# Patient Record
Sex: MALE | Race: WHITE | NOT HISPANIC OR LATINO | Employment: OTHER | URBAN - METROPOLITAN AREA
[De-identification: names, ages, dates, MRNs, and addresses within clinical notes are randomized per-mention and may not be internally consistent; named-entity substitution may affect disease eponyms.]

---

## 2019-06-05 ENCOUNTER — TRANSCRIBE ORDERS (OUTPATIENT)
Dept: NEUROSURGERY | Facility: CLINIC | Age: 64
End: 2019-06-05

## 2019-06-05 DIAGNOSIS — M54.50 LOWER BACK PAIN: Primary | ICD-10-CM

## 2019-06-10 ENCOUNTER — OFFICE VISIT (OUTPATIENT)
Dept: NEUROSURGERY | Facility: CLINIC | Age: 64
End: 2019-06-10
Payer: MEDICARE

## 2019-06-10 VITALS
TEMPERATURE: 97.8 F | HEIGHT: 71 IN | HEART RATE: 80 BPM | RESPIRATION RATE: 16 BRPM | BODY MASS INDEX: 25.34 KG/M2 | WEIGHT: 181 LBS | SYSTOLIC BLOOD PRESSURE: 132 MMHG | DIASTOLIC BLOOD PRESSURE: 76 MMHG

## 2019-06-10 DIAGNOSIS — M54.16 LUMBAR RADICULOPATHY: ICD-10-CM

## 2019-06-10 DIAGNOSIS — M48.062 SPINAL STENOSIS OF LUMBAR REGION WITH NEUROGENIC CLAUDICATION: Primary | ICD-10-CM

## 2019-06-10 DIAGNOSIS — M25.552 LEFT HIP PAIN: ICD-10-CM

## 2019-06-10 DIAGNOSIS — M54.50 LOWER BACK PAIN: ICD-10-CM

## 2019-06-10 PROCEDURE — 99204 OFFICE O/P NEW MOD 45 MIN: CPT | Performed by: PHYSICIAN ASSISTANT

## 2019-06-10 RX ORDER — ALBUTEROL SULFATE 90 UG/1
AEROSOL, METERED RESPIRATORY (INHALATION)
COMMUNITY

## 2019-06-10 RX ORDER — OXYCODONE HYDROCHLORIDE 5 MG/1
5 TABLET ORAL EVERY 6 HOURS PRN
COMMUNITY
Start: 2019-06-05 | End: 2019-06-17

## 2019-06-10 RX ORDER — PREDNISONE 20 MG/1
TABLET ORAL
COMMUNITY
Start: 2019-06-05 | End: 2019-06-17

## 2019-06-10 RX ORDER — GABAPENTIN 600 MG/1
2 TABLET ORAL 3 TIMES DAILY
COMMUNITY
End: 2019-08-22 | Stop reason: ALTCHOICE

## 2019-06-10 RX ORDER — DULOXETIN HYDROCHLORIDE 60 MG/1
CAPSULE, DELAYED RELEASE ORAL DAILY
COMMUNITY

## 2019-06-10 RX ORDER — OXYCODONE AND ACETAMINOPHEN 7.5; 325 MG/1; MG/1
TABLET ORAL
COMMUNITY
End: 2019-06-10 | Stop reason: ALTCHOICE

## 2019-06-12 ENCOUNTER — HOSPITAL ENCOUNTER (OUTPATIENT)
Dept: RADIOLOGY | Facility: HOSPITAL | Age: 64
Discharge: HOME | End: 2019-06-12
Payer: MEDICARE

## 2019-06-12 DIAGNOSIS — M48.062 SPINAL STENOSIS OF LUMBAR REGION WITH NEUROGENIC CLAUDICATION: ICD-10-CM

## 2019-06-12 DIAGNOSIS — M54.16 LUMBAR RADICULOPATHY: ICD-10-CM

## 2019-06-12 DIAGNOSIS — M54.50 LOWER BACK PAIN: ICD-10-CM

## 2019-06-12 DIAGNOSIS — M25.552 LEFT HIP PAIN: ICD-10-CM

## 2019-06-12 PROCEDURE — 73522 X-RAY EXAM HIPS BI 3-4 VIEWS: CPT

## 2019-06-12 PROCEDURE — 72114 X-RAY EXAM L-S SPINE BENDING: CPT

## 2019-06-17 ENCOUNTER — OFFICE VISIT (OUTPATIENT)
Dept: OBGYN CLINIC | Facility: HOSPITAL | Age: 64
End: 2019-06-17
Payer: MEDICARE

## 2019-06-17 VITALS
WEIGHT: 183 LBS | SYSTOLIC BLOOD PRESSURE: 135 MMHG | HEIGHT: 71 IN | DIASTOLIC BLOOD PRESSURE: 82 MMHG | BODY MASS INDEX: 25.62 KG/M2 | HEART RATE: 85 BPM

## 2019-06-17 DIAGNOSIS — M16.12 PRIMARY OSTEOARTHRITIS OF ONE HIP, LEFT: ICD-10-CM

## 2019-06-17 DIAGNOSIS — M25.552 LEFT HIP PAIN: ICD-10-CM

## 2019-06-17 DIAGNOSIS — M70.62 TROCHANTERIC BURSITIS OF LEFT HIP: Primary | ICD-10-CM

## 2019-06-17 PROCEDURE — 20610 DRAIN/INJ JOINT/BURSA W/O US: CPT | Performed by: PHYSICIAN ASSISTANT

## 2019-06-17 PROCEDURE — 99203 OFFICE O/P NEW LOW 30 MIN: CPT | Performed by: PHYSICIAN ASSISTANT

## 2019-06-17 RX ORDER — BUPIVACAINE HYDROCHLORIDE 2.5 MG/ML
2 INJECTION, SOLUTION INFILTRATION; PERINEURAL
Status: COMPLETED | OUTPATIENT
Start: 2019-06-17 | End: 2019-06-17

## 2019-06-17 RX ORDER — METHYLPREDNISOLONE ACETATE 40 MG/ML
2 INJECTION, SUSPENSION INTRA-ARTICULAR; INTRALESIONAL; INTRAMUSCULAR; SOFT TISSUE
Status: COMPLETED | OUTPATIENT
Start: 2019-06-17 | End: 2019-06-17

## 2019-06-17 RX ORDER — LIDOCAINE HYDROCHLORIDE 10 MG/ML
5 INJECTION, SOLUTION INFILTRATION; PERINEURAL
Status: COMPLETED | OUTPATIENT
Start: 2019-06-17 | End: 2019-06-17

## 2019-06-17 RX ADMIN — BUPIVACAINE HYDROCHLORIDE 2 ML: 2.5 INJECTION, SOLUTION INFILTRATION; PERINEURAL at 19:06

## 2019-06-17 RX ADMIN — METHYLPREDNISOLONE ACETATE 2 ML: 40 INJECTION, SUSPENSION INTRA-ARTICULAR; INTRALESIONAL; INTRAMUSCULAR; SOFT TISSUE at 19:06

## 2019-06-17 RX ADMIN — LIDOCAINE HYDROCHLORIDE 5 ML: 10 INJECTION, SOLUTION INFILTRATION; PERINEURAL at 19:06

## 2019-06-21 ENCOUNTER — OFFICE VISIT (OUTPATIENT)
Dept: OBGYN CLINIC | Facility: MEDICAL CENTER | Age: 64
End: 2019-06-21
Payer: MEDICARE

## 2019-06-21 VITALS
SYSTOLIC BLOOD PRESSURE: 130 MMHG | DIASTOLIC BLOOD PRESSURE: 78 MMHG | HEIGHT: 71 IN | WEIGHT: 179.4 LBS | BODY MASS INDEX: 25.11 KG/M2 | HEART RATE: 82 BPM

## 2019-06-21 DIAGNOSIS — M16.12 ARTHRITIS OF LEFT HIP: Primary | ICD-10-CM

## 2019-06-21 DIAGNOSIS — M70.62 TROCHANTERIC BURSITIS OF LEFT HIP: ICD-10-CM

## 2019-06-21 PROCEDURE — 99214 OFFICE O/P EST MOD 30 MIN: CPT | Performed by: ORTHOPAEDIC SURGERY

## 2019-06-21 RX ORDER — DICLOFENAC SODIUM 75 MG/1
75 TABLET, DELAYED RELEASE ORAL 2 TIMES DAILY PRN
Qty: 60 TABLET | Refills: 1 | Status: SHIPPED | OUTPATIENT
Start: 2019-06-21

## 2019-06-27 ENCOUNTER — HOSPITAL ENCOUNTER (OUTPATIENT)
Facility: HOSPITAL | Age: 64
Setting detail: OBSERVATION
Discharge: HOME | End: 2019-06-28
Attending: EMERGENCY MEDICINE | Admitting: INTERNAL MEDICINE
Payer: MEDICARE

## 2019-06-27 ENCOUNTER — APPOINTMENT (EMERGENCY)
Dept: RADIOLOGY | Facility: HOSPITAL | Age: 64
End: 2019-06-27
Payer: MEDICARE

## 2019-06-27 DIAGNOSIS — M79.605 PAIN OF LEFT LOWER EXTREMITY: ICD-10-CM

## 2019-06-27 DIAGNOSIS — R26.2 AMBULATORY DYSFUNCTION: Primary | ICD-10-CM

## 2019-06-27 DIAGNOSIS — M70.62 TROCHANTERIC BURSITIS OF LEFT HIP: ICD-10-CM

## 2019-06-27 DIAGNOSIS — M79.605 LEFT LEG PAIN: ICD-10-CM

## 2019-06-27 PROBLEM — M79.606 LEG PAIN: Status: ACTIVE | Noted: 2019-06-27

## 2019-06-27 PROBLEM — Z86.03 HISTORY OF RESECTION OF MENINGIOMA: Status: ACTIVE | Noted: 2019-06-27

## 2019-06-27 PROBLEM — Z72.0 TOBACCO ABUSE: Status: ACTIVE | Noted: 2019-06-27

## 2019-06-27 PROBLEM — Z98.890 HISTORY OF RESECTION OF MENINGIOMA: Status: ACTIVE | Noted: 2019-06-27

## 2019-06-27 PROBLEM — M70.60 TROCHANTERIC BURSITIS: Status: ACTIVE | Noted: 2019-06-27

## 2019-06-27 LAB
ANION GAP SERPL CALCULATED.3IONS-SCNC: 7 MMOL/L (ref 4–13)
BASOPHILS # BLD AUTO: 0.05 THOUSANDS/ΜL (ref 0–0.1)
BASOPHILS NFR BLD AUTO: 1 % (ref 0–1)
BUN SERPL-MCNC: 25 MG/DL (ref 5–25)
CALCIUM SERPL-MCNC: 10.4 MG/DL (ref 8.3–10.1)
CHLORIDE SERPL-SCNC: 107 MMOL/L (ref 100–108)
CO2 SERPL-SCNC: 25 MMOL/L (ref 21–32)
CREAT SERPL-MCNC: 1.05 MG/DL (ref 0.6–1.3)
CRP SERPL QL: 6.6 MG/L
EOSINOPHIL # BLD AUTO: 0.1 THOUSAND/ΜL (ref 0–0.61)
EOSINOPHIL NFR BLD AUTO: 1 % (ref 0–6)
ERYTHROCYTE [DISTWIDTH] IN BLOOD BY AUTOMATED COUNT: 14.3 % (ref 11.6–15.1)
ERYTHROCYTE [SEDIMENTATION RATE] IN BLOOD: 7 MM/HOUR (ref 0–10)
GFR SERPL CREATININE-BSD FRML MDRD: 75 ML/MIN/1.73SQ M
GLUCOSE SERPL-MCNC: 169 MG/DL (ref 65–140)
HCT VFR BLD AUTO: 53.3 % (ref 36.5–49.3)
HGB BLD-MCNC: 17.3 G/DL (ref 12–17)
IMM GRANULOCYTES # BLD AUTO: 0.04 THOUSAND/UL (ref 0–0.2)
IMM GRANULOCYTES NFR BLD AUTO: 0 % (ref 0–2)
LYMPHOCYTES # BLD AUTO: 3.29 THOUSANDS/ΜL (ref 0.6–4.47)
LYMPHOCYTES NFR BLD AUTO: 37 % (ref 14–44)
MCH RBC QN AUTO: 32.5 PG (ref 26.8–34.3)
MCHC RBC AUTO-ENTMCNC: 32.5 G/DL (ref 31.4–37.4)
MCV RBC AUTO: 100 FL (ref 82–98)
MONOCYTES # BLD AUTO: 0.6 THOUSAND/ΜL (ref 0.17–1.22)
MONOCYTES NFR BLD AUTO: 7 % (ref 4–12)
NEUTROPHILS # BLD AUTO: 4.92 THOUSANDS/ΜL (ref 1.85–7.62)
NEUTS SEG NFR BLD AUTO: 54 % (ref 43–75)
NRBC BLD AUTO-RTO: 0 /100 WBCS
PLATELET # BLD AUTO: 348 THOUSANDS/UL (ref 149–390)
PMV BLD AUTO: 9.7 FL (ref 8.9–12.7)
POTASSIUM SERPL-SCNC: 4.5 MMOL/L (ref 3.5–5.3)
RBC # BLD AUTO: 5.33 MILLION/UL (ref 3.88–5.62)
SODIUM SERPL-SCNC: 139 MMOL/L (ref 136–145)
WBC # BLD AUTO: 9 THOUSAND/UL (ref 4.31–10.16)

## 2019-06-27 PROCEDURE — 96376 TX/PRO/DX INJ SAME DRUG ADON: CPT

## 2019-06-27 PROCEDURE — 86140 C-REACTIVE PROTEIN: CPT | Performed by: EMERGENCY MEDICINE

## 2019-06-27 PROCEDURE — 99285 EMERGENCY DEPT VISIT HI MDM: CPT | Performed by: EMERGENCY MEDICINE

## 2019-06-27 PROCEDURE — 99220 PR INITIAL OBSERVATION CARE/DAY 70 MINUTES: CPT | Performed by: INTERNAL MEDICINE

## 2019-06-27 PROCEDURE — 99285 EMERGENCY DEPT VISIT HI MDM: CPT

## 2019-06-27 PROCEDURE — 96374 THER/PROPH/DIAG INJ IV PUSH: CPT

## 2019-06-27 PROCEDURE — NC001 PR NO CHARGE: Performed by: INTERNAL MEDICINE

## 2019-06-27 PROCEDURE — 85025 COMPLETE CBC W/AUTO DIFF WBC: CPT | Performed by: EMERGENCY MEDICINE

## 2019-06-27 PROCEDURE — 96375 TX/PRO/DX INJ NEW DRUG ADDON: CPT

## 2019-06-27 PROCEDURE — 80048 BASIC METABOLIC PNL TOTAL CA: CPT | Performed by: EMERGENCY MEDICINE

## 2019-06-27 PROCEDURE — 85652 RBC SED RATE AUTOMATED: CPT | Performed by: EMERGENCY MEDICINE

## 2019-06-27 PROCEDURE — 36415 COLL VENOUS BLD VENIPUNCTURE: CPT | Performed by: EMERGENCY MEDICINE

## 2019-06-27 PROCEDURE — 73700 CT LOWER EXTREMITY W/O DYE: CPT

## 2019-06-27 PROCEDURE — 1123F ACP DISCUSS/DSCN MKR DOCD: CPT | Performed by: INTERNAL MEDICINE

## 2019-06-27 RX ORDER — ACETAMINOPHEN 325 MG/1
975 TABLET ORAL EVERY 8 HOURS SCHEDULED
Status: DISCONTINUED | OUTPATIENT
Start: 2019-06-27 | End: 2019-06-28 | Stop reason: HOSPADM

## 2019-06-27 RX ORDER — FENTANYL CITRATE 50 UG/ML
50 INJECTION, SOLUTION INTRAMUSCULAR; INTRAVENOUS ONCE
Status: COMPLETED | OUTPATIENT
Start: 2019-06-27 | End: 2019-06-27

## 2019-06-27 RX ORDER — NICOTINE 21 MG/24HR
1 PATCH, TRANSDERMAL 24 HOURS TRANSDERMAL DAILY
Status: DISCONTINUED | OUTPATIENT
Start: 2019-06-27 | End: 2019-06-28 | Stop reason: HOSPADM

## 2019-06-27 RX ORDER — HYDROMORPHONE HCL/PF 1 MG/ML
0.5 SYRINGE (ML) INJECTION EVERY 4 HOURS PRN
Status: DISCONTINUED | OUTPATIENT
Start: 2019-06-27 | End: 2019-06-27

## 2019-06-27 RX ORDER — OXYCODONE HYDROCHLORIDE 5 MG/1
5 TABLET ORAL EVERY 4 HOURS PRN
Status: DISCONTINUED | OUTPATIENT
Start: 2019-06-27 | End: 2019-06-28

## 2019-06-27 RX ORDER — LIDOCAINE 50 MG/G
1 PATCH TOPICAL DAILY
Status: DISCONTINUED | OUTPATIENT
Start: 2019-06-28 | End: 2019-06-28 | Stop reason: HOSPADM

## 2019-06-27 RX ORDER — DULOXETIN HYDROCHLORIDE 60 MG/1
60 CAPSULE, DELAYED RELEASE ORAL DAILY
Status: DISCONTINUED | OUTPATIENT
Start: 2019-06-28 | End: 2019-06-28 | Stop reason: HOSPADM

## 2019-06-27 RX ORDER — KETOROLAC TROMETHAMINE 30 MG/ML
15 INJECTION, SOLUTION INTRAMUSCULAR; INTRAVENOUS ONCE
Status: COMPLETED | OUTPATIENT
Start: 2019-06-27 | End: 2019-06-27

## 2019-06-27 RX ORDER — METHOCARBAMOL 500 MG/1
500 TABLET, FILM COATED ORAL EVERY 6 HOURS PRN
Status: DISCONTINUED | OUTPATIENT
Start: 2019-06-27 | End: 2019-06-28 | Stop reason: HOSPADM

## 2019-06-27 RX ORDER — GABAPENTIN 300 MG/1
600 CAPSULE ORAL 3 TIMES DAILY
Status: DISCONTINUED | OUTPATIENT
Start: 2019-06-27 | End: 2019-06-28 | Stop reason: HOSPADM

## 2019-06-27 RX ORDER — HYDROMORPHONE HCL/PF 1 MG/ML
0.2 SYRINGE (ML) INJECTION EVERY 4 HOURS PRN
Status: DISCONTINUED | OUTPATIENT
Start: 2019-06-27 | End: 2019-06-28

## 2019-06-27 RX ORDER — HYDROMORPHONE HCL/PF 1 MG/ML
0.2 SYRINGE (ML) INJECTION EVERY 4 HOURS PRN
Status: DISCONTINUED | OUTPATIENT
Start: 2019-06-27 | End: 2019-06-27

## 2019-06-27 RX ORDER — KETOROLAC TROMETHAMINE 30 MG/ML
30 INJECTION, SOLUTION INTRAMUSCULAR; INTRAVENOUS EVERY 6 HOURS PRN
Status: DISCONTINUED | OUTPATIENT
Start: 2019-06-27 | End: 2019-06-28 | Stop reason: HOSPADM

## 2019-06-27 RX ADMIN — ACETAMINOPHEN 975 MG: 325 TABLET ORAL at 19:45

## 2019-06-27 RX ADMIN — FENTANYL CITRATE 50 MCG: 50 INJECTION INTRAMUSCULAR; INTRAVENOUS at 11:55

## 2019-06-27 RX ADMIN — KETOROLAC TROMETHAMINE 15 MG: 30 INJECTION, SOLUTION INTRAMUSCULAR at 14:49

## 2019-06-27 RX ADMIN — FENTANYL CITRATE 50 MCG: 50 INJECTION INTRAMUSCULAR; INTRAVENOUS at 13:28

## 2019-06-27 RX ADMIN — HYDROMORPHONE HYDROCHLORIDE 0.2 MG: 1 INJECTION, SOLUTION INTRAMUSCULAR; INTRAVENOUS; SUBCUTANEOUS at 19:45

## 2019-06-27 RX ADMIN — NICOTINE 1 PATCH: 21 PATCH, EXTENDED RELEASE TRANSDERMAL at 19:44

## 2019-06-27 RX ADMIN — OXYCODONE HYDROCHLORIDE 5 MG: 5 TABLET ORAL at 17:38

## 2019-06-27 RX ADMIN — HYDROMORPHONE HYDROCHLORIDE 0.5 MG: 1 INJECTION, SOLUTION INTRAMUSCULAR; INTRAVENOUS; SUBCUTANEOUS at 16:26

## 2019-06-27 NOTE — PLAN OF CARE
Problem: Potential for Falls  Goal: Patient will remain free of falls  Description  INTERVENTIONS:  - Assess patient frequently for physical needs  -  Identify cognitive and physical deficits and behaviors that affect risk of falls    -  [place] fall precautions as indicated by assessment   - Educate patient/family on patient safety including physical limitations  - Instruct patient to call for assistance with activity based on assessment  - Modify environment to reduce risk of injury  - Consider OT/PT consult to assist with strengthening/mobility  Outcome: Progressing     Problem: PAIN - ADULT  Goal: Verbalizes/displays adequate comfort level or baseline comfort level  Description  Interventions:  - Encourage patient to monitor pain and request assistance  - Assess pain using appropriate pain scale  - Administer analgesics based on type and severity of pain and evaluate response  - Implement non-pharmacological measures as appropriate and evaluate response  - Consider cultural and social influences on pain and pain management  - Notify physician/advanced practitioner if interventions unsuccessful or patient reports new pain  Outcome: Progressing     Problem: INFECTION - ADULT  Goal: Absence or prevention of progression during hospitalization  Description  INTERVENTIONS:  - Assess and monitor for signs and symptoms of infection  - Monitor lab/diagnostic results  - Monitor all insertion sites, i e  indwelling lines, tubes, and drains  - Monitor endotracheal (as able) and nasal secretions for changes in amount and color  - Lyle appropriate cooling/warming therapies per order  - Administer medications as ordered  - Instruct and encourage patient and family to use good hand hygiene technique  - Identify and instruct in appropriate isolation precautions for identified infection/condition  Outcome: Progressing     Problem: SAFETY ADULT  Goal: Maintain or return to baseline ADL function  Description  INTERVENTIONS:  -  Assess patient's ability to carry out ADLs; assess patient's baseline for ADL function and identify physical deficits which impact ability to perform ADLs (bathing, care of mouth/teeth, toileting, grooming, dressing, etc )  - Assess/evaluate cause of self-care deficits   - Assess range of motion  - Assess patient's mobility; develop plan if impaired  - Assess patient's need for assistive devices and provide as appropriate  - Encourage maximum independence but intervene and supervise when necessary  ¯ Involve family in performance of ADLs  ¯ Assess for home care needs following discharge   ¯ Request OT consult to assist with ADL evaluation and planning for discharge  ¯ Provide patient education as appropriate  Outcome: Progressing  Goal: Maintain or return mobility status to optimal level  Description  INTERVENTIONS:  - Assess patient's baseline mobility status (ambulation, transfers, stairs, etc )    - Identify cognitive and physical deficits and behaviors that affect mobility  - Identify mobility aids required to assist with transfers and/or ambulation (gait belt, sit-to-stand, lift, walker, cane, etc )  - [place] fall precautions as indicated by assessment  - Record patient progress and toleration of activity level on Mobility SBAR; progress patient to next Phase/Stage  - Instruct patient to call for assistance with activity based on assessment  - Request Rehabilitation consult to assist with strengthening/weightbearing, etc   Outcome: Progressing     Problem: DISCHARGE PLANNING  Goal: Discharge to home or other facility with appropriate resources  Description  INTERVENTIONS:  - Identify barriers to discharge w/patient and caregiver  - Arrange for needed discharge resources and transportation as appropriate  - Identify discharge learning needs (meds, wound care, etc )  - Arrange for interpretive services to assist at discharge as needed  - Refer to Case Management Department for coordinating discharge planning if the patient needs post-hospital services based on physician/advanced practitioner order or complex needs related to functional status, cognitive ability, or social support system  Outcome: Progressing

## 2019-06-27 NOTE — ED ATTENDING ATTESTATION
Reniier Bowser MD, saw and evaluated the patient  I have discussed the patient with the resident/non-physician practitioner and agree with the resident's/non-physician practitioner's findings, Plan of Care, and MDM as documented in the resident's/non-physician practitioner's note, except where noted  All available labs and Radiology studies were reviewed  I was present for key portions of any procedure(s) performed by the resident/non-physician practitioner and I was immediately available to provide assistance  At this point I agree with the current assessment done in the Emergency Department  I have conducted an independent evaluation of this patient a history and physical is as follows:    OA:  This is evaluation of a 70-year-old male with past medical history of left trochanteric bursitis and previous back surgery done at [facility] who presents to the emergency department with left leg pain  Patient states that he had a steroid injection for his bursitis on the 17th of June and since that time he has had progressive pain weakness and difficulty ambulating  He denies any other symptoms of back pain  No abdominal pain  No nausea no vomiting  No fevers no chills  Patient also states that when he arrived here in hospital nursing staff noted that he had a rash over his lateral hip going across his groin  This is the same distribution of which is pain radiates  No fevers no chills  Tolerating p o  Without difficulty  No chest pain no shortness of breath no lightheadedness or dizziness  On physical exam patient is uncomfortable but nontoxic  He is mildly tachycardic  HEENT is normocephalic and atraumatic with moist mucous membranes and clear sclera and conjunctiva  Neck supple full range of motion  There is no midline tenderness to palpation over CT or L-spine  There is a well-healed scar over his lower L-spine    Patient has discrete tenderness to palpation over his left lateral hip pain with range of motion  There is no associated edema or erythema however the patient does have a blanching macular rash that extends over his lateral hip across his groin and lower abdomen  It is nonpruritic  It is nonpainful  There are no associated this colles or bull eye  He has intact distal pulses  Capillary refills less than 2 seconds  There is no lower extremity edema  Distal reflexes of patella and ankle are intact  He has intact strength  Assessment and plan left hip pain with rash  Given history will obtain CT imaging  Will check basic blood work  Will treat pain  Monitor re-evaluate dispo accordingly  Portions of the record may have been created with voice recognition software  Occasional wrong word or sound-a-like" substitutions may have occurred due to the inherent limitations of voice recognition software  Review chart carefully and recognize, using context, where substitutions have occurred    Critical Care Time  Procedures

## 2019-06-27 NOTE — ED NOTES
Pt reports decrease in LLE pain following Fentanyl admin  Pt also instructed to provide urine sample and was provided with urinal at bedside        Audelia Craft RN  06/27/19 7038

## 2019-06-27 NOTE — ED RE-EVALUATION NOTE
Pt with resolution of rash  Pain is persistent  Discussed further imaging, including that of his spine  On exam, he has weakness but intact reflexes and no pain in BM/bladder   Agreeable to admission for further evaluation, pain control and ambulatory dysfunction     Grey Ruiz MD  06/30/19 6216

## 2019-06-27 NOTE — ED NOTES
Pt reports he is unable to provide urine sample at this time     Amira Christensen, ANDREA  06/27/19 8427

## 2019-06-27 NOTE — PROGRESS NOTES
INTERNAL MEDICINE RESIDENCY  SENIOR ADMISSION NOTE     Unit/Bed#: ED 15   Encounter: [id number]  SOD Team A    PATIENT INFORMATION     Anais Hay   58 y o  male   MRN: [id number]  Hospital Stay Days: 0    HISTORY OF PRESENT ILLNESS     76-year-old male with past medical history of nicotine dependence, chronic left hip pain and low back pain with multiple previous procedural interventions involving the lumbar spine beginning approximately 30 years ago  More recently, patient was complaining of acute on chronic left hip pain radiating to groin and buttocks  The patient has been evaluated by several specialists in the outpatient setting including neurosurgery and orthopedics  It was felt that the patient's primary concern of left sided hip/buttock pain is due to osteoarthritis of the left hip  There is also likely an element of lumbar neurogenic claudication secondary to adjacent levels of stenosis as well as previous fusions  The patient underwent left trochanteric bursa steroid injection on 06/17/2019 after seeing orthopedics that provided only transient relief  The patient had follow-up with orthopedics the following week due to ongoing pain  Etiology was likely osteoarthritis of the left hip as well as trochanteric bursitis of the left hip  The patient was ordered an MRI arthrogram to rule out a labral tear and provided with p r n  analgesics including diclofenac and acetaminophen  Per office records, patient declined referral to see pain management at that time  The patient presents to [facility] ED today due to ongoing complaints of severe left-sided hip pain following the steroid injection that he received on 06/17/2019  Pain was reportedly worse ever since receiving the injection and described as a burning sensation  Of note, there was description of a blanching macular rash that was present on the patient's left hip that has apparently resolved at this time    Patient received several different medications for pain control without significant relief  Because of his difficulty ambulating and uncontrolled pain, patient was recommended for admission  Reviewed H&P per Dr Ros Levine  Agree with the assessment and plan except as noted below  ASSESSMENT/PLAN     Principal Problem:    Leg pain  Active Problems:    History of resection of meningioma    Trochanteric bursitis    Tobacco abuse     1  Left hip pain, low back pain - likely secondary to several components including primarily osteoarthritis of the left hip with trochanteric bursitis, now status post steroid injection with interval exacerbation of pain  Also likely component of lumbar neurogenic claudication in setting of multiple previous lumbar surgical interventions  Lower suspicion for acute infectious bursitis given lack of infectious signs including fever or leukocytosis, normal sed rate, and only marginally elevated CRP, however this remains a concern given recent injection  Differential also includes possible labral tear as suggested at recent orthopedics visit  CT of the left hip without contrast shows no acute osseous abnormality  · Pain control:  · Robaxin 500 mg every 8 hours as needed  · Lidoderm patch 5%  · Voltaren gel apply to left hip as needed  · Acetaminophen 975 mg every 8 hours scheduled  · IV Toradol every 6 hours as needed for moderate pain  · Oxycodone every 6 hours as needed for severe pain  · IV Dilaudid every 6 hours as needed for breakthrough pain  · PT/OT evaluation  · Per orthopedics, left hip bursa aspirations are performed by IR; will place order for FL guided needle aspiration of left hip bursa  · MRI arthrogram during this visit to rule out labral tear as recommended at recent orthopedic office visit  · Continue supportive care and clinical monitoring    Remainder of management for chronic conditions as detailed in H&P per Dr Ros Levine      Code Status: Level 1 Full Code  Diet: Regular House  VTE Prophylaxis: Lovenox, SCDs    Disposition: OBSERVATION  Expected LOS: <2 Midnights    ==  Merlinda Lao, DO  Chief Resident, PGY-3  [facility] Medicine Res[facility]

## 2019-06-27 NOTE — ED NOTES
Multiple attempts to call [facility] for 10 min heads up made with no answer     Jon Ruano, RN  06/27/19 0677

## 2019-06-27 NOTE — ED PROVIDER NOTES
History  Chief Complaint   Patient presents with    Leg Pain     Pt c/o left leg pain  Pt has seen multiple doctors for this  [facility] neurosurgeon told his is was his back and needed surgery but pt snokes so she would not perform surgery  He then saw a [facility] who said maybe it is not his back and maybe it is his hip  He then saw an ortho specialist who ordered MRI but when he called to schedule the MRI they couldnt do it until July and he states eh cant wait in pain that long     57 y/o presents with Rt hip pain x 10 days following a steroid injection on 19 for chronic bursitis  Pt states that since the injection he has had pain and this am he had 10/10 pain localized to the Lt LE from the Hip top the Knee  Pain is a burning sensation  When he looked at his leg he noticed a red rash on his Lt leg  Pt can not toelrate the pain and presented to the ED for eval and MRI    Denies f,c,n,v,d,constipation, melena, hematochezia, dysuria, hematuria, genital d/c, flank pain, chest pain, abd pain, back pain, hematemesis, Pt states he had a zoster vaccine and has no hx of zoster                    Prior to Admission Medications   Prescriptions Last Dose Informant Patient Reported? Taking? DULoxetine (CYMBALTA) 60 mg delayed release capsule 2019 at Unknown time  Yes Yes   Sig: Daily   albuterol (VENTOLIN HFA) 90 mcg/act inhaler Past Week at Unknown time  Yes Yes   Sig: ventolin hfa 108 (90 base) mcg/actaers   diclofenac (VOLTAREN) 75 mg EC tablet Past Week at Unknown time  No Yes   Sig: Take 1 tablet (75 mg total) by mouth 2 (two) times a day as needed (pain) Take with food     gabapentin (NEURONTIN) 600 MG tablet 2019 at Unknown time  Yes Yes   Si capsules 3 (three) times a day      Facility-Administered Medications: None       Past Medical History:   Diagnosis Date    COPD (chronic obstructive pulmonary disease) ([facility] [address] )        Past Surgical History:   Procedure Laterality Date    SPINE SURGERY History reviewed  No pertinent family history  I have reviewed and agree with the history as documented  Social History     Tobacco Use    Smoking status: Current Every Day Smoker     Packs/day: 0 50     Years: 30 00     Pack years: 15 00    Smokeless tobacco: Never Used   Substance Use Topics    Alcohol use: Not Currently    Drug use: Never        Review of Systems   Constitutional: Negative for chills, diaphoresis, fatigue and fever  HENT: Negative for congestion, ear discharge, facial swelling, hearing loss, rhinorrhea, sinus pressure, sinus pain, sneezing, sore throat, tinnitus and trouble swallowing  Eyes: Negative for pain, discharge and redness  Respiratory: Negative for cough, choking, chest tightness, shortness of breath, wheezing and stridor  Cardiovascular: Negative for chest pain, palpitations and leg swelling  Gastrointestinal: Negative for abdominal distention, abdominal pain, blood in stool, constipation, diarrhea, nausea and vomiting  Endocrine: Negative for cold intolerance, polydipsia and polyuria  Genitourinary: Negative for difficulty urinating, dysuria, enuresis, flank pain, frequency and hematuria  Musculoskeletal: Positive for arthralgias  Negative for back pain, gait problem and neck stiffness  Skin: Negative for rash and wound  Neurological: Negative for dizziness, seizures, syncope, weakness, numbness and headaches  Hematological: Negative for adenopathy  Psychiatric/Behavioral: Negative for agitation, confusion, hallucinations, sleep disturbance and suicidal ideas  All other systems reviewed and are negative        Physical Exam  ED Triage Vitals   Temperature Pulse Respirations Blood Pressure SpO2   06/27/19 1133 06/27/19 1055 06/27/19 1055 06/27/19 1055 06/27/19 1055   98 5 °F (36 9 °C) (!) 118 20 120/79 97 %      Temp Source Heart Rate Source Patient Position - Orthostatic VS BP Location FiO2 (%)   06/27/19 1133 06/27/19 1055 06/27/19 1055 06/27/19 1055 --   Oral Monitor Lying Right arm       Pain Score       06/27/19 1055       Worst Possible Pain             Orthostatic Vital Signs  Vitals:    06/27/19 1326 06/27/19 1400 06/27/19 1455 06/27/19 1500   BP: 122/78 130/86 (!) 148/104 152/85   Pulse: (!) 113 92 96    Patient Position - Orthostatic VS: Lying Lying Lying        Physical Exam   Constitutional: He is oriented to person, place, and time  He appears well-developed and well-nourished  No distress  HENT:   Head: Normocephalic and atraumatic  Eyes: Pupils are equal, round, and reactive to light  Conjunctivae and EOM are normal    Neck: Normal range of motion  Cardiovascular: Normal rate and regular rhythm  Exam reveals no gallop and no friction rub  No murmur heard  Pulmonary/Chest: Breath sounds normal  No respiratory distress  He has no wheezes  He has no rales  Abdominal: Soft  Normal appearance and bowel sounds are normal  There is no tenderness  There is no rigidity, no rebound, no guarding, no CVA tenderness, no tenderness at McBurney's point and negative Fairchild's sign  Neurological: He is alert and oriented to person, place, and time  No cranial nerve deficit or sensory deficit  GCS eye subscore is 4  GCS verbal subscore is 5  GCS motor subscore is 6  Reflex Scores:       Bicep reflexes are 2+ on the right side and 2+ on the left side  Patellar reflexes are 2+ on the right side and 2+ on the left side  Patient has equal 5/5 strength b/l UE and Le  No focal neuro deficits noted  Skin: Skin is warm and dry  Capillary refill takes less than 2 seconds  Psychiatric: He has a normal mood and affect  His behavior is normal  Judgment and thought content normal    Nursing note and vitals reviewed        ED Medications  Medications   fentanyl citrate (PF) 100 MCG/2ML 50 mcg (50 mcg Intravenous Given 6/27/19 1155)   fentanyl citrate (PF) 100 MCG/2ML 50 mcg (50 mcg Intravenous Given 6/27/19 1328)   ketorolac (TORADOL) injection 15 mg (15 mg Intravenous Given 6/27/19 1449)       Diagnostic Studies  Results Reviewed     Procedure Component Value Units Date/Time    Sedimentation rate, automated [[id number]]  (Normal) Collected:  06/27/19 1158    Lab Status:  Final result Specimen:  Blood from Arm, Right Updated:  06/27/19 1358     Sed Rate 7 mm/hour     Basic metabolic panel [[id number]]  (Abnormal) Collected:  06/27/19 1158    Lab Status:  Final result Specimen:  Blood from Arm, Right Updated:  06/27/19 1222     Sodium 139 mmol/L      Potassium 4 5 mmol/L      Chloride 107 mmol/L      CO2 25 mmol/L      ANION GAP 7 mmol/L      BUN 25 mg/dL      Creatinine 1 05 mg/dL      Glucose 169 mg/dL      Calcium 10 4 mg/dL      eGFR 75 ml/min/1 73sq m     Narrative:       [place] guidelines for Chronic Kidney Disease (CKD):     Stage 1 with normal or high GFR (GFR > 90 mL/min/1 73 square meters)    Stage 2 Mild CKD (GFR = 60-89 mL/min/1 73 square meters)    Stage 3A Moderate CKD (GFR = 45-59 mL/min/1 73 square meters)    Stage 3B Moderate CKD (GFR = 30-44 mL/min/1 73 square meters)    Stage 4 Severe CKD (GFR = 15-29 mL/min/1 73 square meters)    Stage 5 End Stage CKD (GFR <15 mL/min/1 73 square meters)  Note: GFR calculation is accurate only with a steady state creatinine    C-reactive protein [[id number]]  (Abnormal) Collected:  06/27/19 1158    Lab Status:  Final result Specimen:  Blood from Arm, Right Updated:  06/27/19 1222     CRP 6 6 mg/L     CBC and differential [[id number]]  (Abnormal) Collected:  06/27/19 1158    Lab Status:  Final result Specimen:  Blood from Arm, Right Updated:  06/27/19 1212     WBC 9 00 Thousand/uL      RBC 5 33 Million/uL      Hemoglobin 17 3 g/dL      Hematocrit 53 3 %       fL      MCH 32 5 pg      MCHC 32 5 g/dL      RDW 14 3 %      MPV 9 7 fL      Platelets 326 Thousands/uL      nRBC 0 /100 WBCs      Neutrophils Relative 54 %      Immat GRANS % 0 %      Lymphocytes Relative 37 %      Monocytes Relative 7 %      Eosinophils Relative 1 %      Basophils Relative 1 %      Neutrophils Absolute 4 92 Thousands/µL      Immature Grans Absolute 0 04 Thousand/uL      Lymphocytes Absolute 3 29 Thousands/µL      Monocytes Absolute 0 60 Thousand/µL      Eosinophils Absolute 0 10 Thousand/µL      Basophils Absolute 0 05 Thousands/µL     POCT urinalysis dipstick [[id number]]     Lab Status:  No result Specimen:  Urine                  CT hip left without contrast   ED Interpretation by Jennifer Edmonds DO (06/27 1451)      No acute osseous abnormality  Workstation performed: [id number]         Final Result by Eula Dias MD (06/27 1346)      No acute osseous abnormality  Workstation performed: [id number]               Procedures  Procedures        ED Course                               MDM  Number of Diagnoses or Management Options  Ambulatory dysfunction: new and requires workup  Left leg pain: new and requires workup  Diagnosis management comments:  Likely vasculitis, however, will CT hip to rule out any kind of abscesses  Due to the recent injection  Patient's CRP is elevated  Patient's rash subsided approximately 1 hour after presentation to the emergency department  Patient's pain has been unable to be managed with IV narcotics, patient was given 25 and then 50 mcg of fentanyl  Patient was then given 0 5 mg of Dilaudid and 15 mg of Toradol  Pain was improved mildly, however, 8/10 at best     patient is unable to ambulate due to pain    1  Ambulatory dysfunction  - admit to Medicine, SOB    2     Poor pain control  -admit to Medicine for failed pain management      Disposition  Final diagnoses:   Left leg pain   Ambulatory dysfunction     Time reflects when diagnosis was documented in both MDM as applicable and the Disposition within this note     Time User Action Codes Description Comment    6/27/2019  3:38 PM Renato Collazo Add [U30 726] Left leg pain 6/27/2019  3:38 PM Yariel Agudelo Add [R26 2] Ambulatory dysfunction     6/27/2019  3:38 PM Yariel Agudelo Modify [M79 605] Left leg pain     6/27/2019  3:38 PM Katia Collazo Modify [R26 2] Ambulatory dysfunction       ED Disposition     ED Disposition Condition Date/Time Comment    Admit Stable Thu Jun 27, 2019  3:38 PM Case was discussed with SOD and the patient's admission status was agreed to be Admission Status: observation status to the service of Dr Slim Fleming   Follow-up Information    None         Patient's Medications   Discharge Prescriptions    No medications on file     No discharge procedures on file  ED Provider  Attending physically available and evaluated Guy Sidra VAUGHN managed the patient along with the ED Attending      Electronically Signed by         Azam Gale DO  06/27/19 3250

## 2019-06-27 NOTE — ED NOTES
Upon assessment rash on pts hips-groin appears to have dissapated  Dr Neo Loera made aware        Benjamen Mary, RN  06/27/19 7221

## 2019-06-27 NOTE — H&P
INTERNAL MEDICINE HISTORY AND PHYSICAL  ED 15 SOD Team A    NAME: Stevne Goldstein  AGE: 58 y o  SEX: male  : 1955   MRN: [id number]  ENCOUNTER: [id number]    DATE: 2019  TIME: 4:13 PM    Primary Care Physician: Sandra Shepard MD  Admitting Provider: Raquel Arrington MD    Chief complaint: Leg Pain    History of Present Illness     Steven Goldstein is a 58 y o  male with a past medical history significant for nicotine dependence, chronic left hip pain, low back pain with multiple previous interventions, meningioma status post resection who presents with complaints of acute left hip pain  The patient states that the pain radiates down to his left groin and buttocks  The patient underwent a left trochanteric bursa steroid injection on 2019  Shortly thereafter, the patient states that he began to experience severe left-sided hip pain at the site of injection  He states that he feels extreme tenderness to palpation of the left hip region  The patient has had numerous steroid injections which usually provide relief  In the ED, the patient was found to be hemodynamically stable  Laboratory analysis was unremarkable and imaging of the left hip with CT scan was negative for acute osseous pathology  The pain was not relieved by fentanyl in the ED  Of note, the patient was scheduled to have an MRI performed sometime in July per Orthopedic surgery  The patient states that he smokes 10 cigarettes per day  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   He denies alcohol abuse or illicit drug use  He endorses no significant family history  Review of Systems   Review of Systems   Constitutional: Negative  HENT: Negative  Eyes: Negative  Respiratory: Negative  Cardiovascular: Negative  Gastrointestinal: Negative  Endocrine: Negative  Genitourinary: Negative  Musculoskeletal:        Hip pain   Skin: Negative  Allergic/Immunologic: Negative      Neurological: Negative  Hematological: Negative  Psychiatric/Behavioral: Negative  Past Medical History     Past Medical History:   Diagnosis Date    COPD (chronic obstructive pulmonary disease) ([facility] [address] )        Past Surgical History     Past Surgical History:   Procedure Laterality Date    SPINE SURGERY         Social History     Social History     Substance and Sexual Activity   Alcohol Use Not Currently     Social History     Substance and Sexual Activity   Drug Use Never     Social History     Tobacco Use   Smoking Status Current Every Day Smoker    Packs/day: 0 50    Years: 30 00    Pack years: 15 00   Smokeless Tobacco Never Used       Family History   History reviewed  No pertinent family history  Medications Prior to Admission     Prior to Admission medications    Medication Sig Start Date End Date Taking? Authorizing Provider   albuterol (VENTOLIN HFA) 90 mcg/act inhaler ventolin hfa 108 (90 base) mcg/actaers   Yes Historical Provider, MD   diclofenac (VOLTAREN) 75 mg EC tablet Take 1 tablet (75 mg total) by mouth 2 (two) times a day as needed (pain) Take with food  6/21/19  Yes Dania Faye Bankelechihof, DO   DULoxetine (CYMBALTA) 60 mg delayed release capsule Daily   Yes Historical Provider, MD   gabapentin (NEURONTIN) 600 MG tablet 2 capsules 3 (three) times a day   Yes Historical Provider, MD       Allergies     Allergies   Allergen Reactions    Cefadroxil Other (See Comments) and Rash    Codeine Other (See Comments)     "sick to stomach"      Iodinated Diagnostic Agents Anaphylaxis    Sulfa Antibiotics Anaphylaxis and GI Intolerance    Clindamycin Rash       Objective     Vitals:    06/27/19 1326 06/27/19 1400 06/27/19 1455 06/27/19 1500   BP: 122/78 130/86 (!) 148/104 152/85   BP Location: Right arm Right arm Right arm    Pulse: (!) 113 92 96    Resp: 18 16 18    Temp:       TempSrc:       SpO2: 95% 95% 95%    Weight:         Body mass index is 25 03 kg/m²    No intake or output data in the 24 hours ending 06/27/19 1613  Invasive Devices     Peripheral Intravenous Line            Peripheral IV 06/27/19 Right Antecubital less than 1 day                Physical Exam   Constitutional: He is oriented to person, place, and time  He appears well-developed and well-nourished  No distress  HENT:   Head: Normocephalic and atraumatic  Nose: Nose normal    Mouth/Throat: No oropharyngeal exudate  Eyes: Conjunctivae are normal  Right eye exhibits no discharge  Left eye exhibits no discharge  No scleral icterus  Neck: Neck supple  No JVD present  Cardiovascular: Normal rate, regular rhythm, normal heart sounds and intact distal pulses  Exam reveals no gallop and no friction rub  No murmur heard  Pulmonary/Chest: Effort normal and breath sounds normal  No respiratory distress  He has no wheezes  He has no rales  Abdominal: Soft  Bowel sounds are normal  He exhibits no distension  There is no tenderness  There is no rebound and no guarding  Musculoskeletal: Normal range of motion  He exhibits tenderness  He exhibits no edema  Decreased ROM in left lower extremity   Neurological: He is alert and oriented to person, place, and time  Skin: Skin is warm and dry  He is not diaphoretic  No erythema  Psychiatric: He has a normal mood and affect  Lab Results: I have personally reviewed pertinent reports      CBC:   Results from last 7 days   Lab Units 06/27/19  1158   WBC Thousand/uL 9 00   RBC Million/uL 5 33   HEMOGLOBIN g/dL 17 3*   HEMATOCRIT % 53 3*   MCV fL 100*   MCH pg 32 5   MCHC g/dL 32 5   RDW % 14 3   MPV fL 9 7   PLATELETS Thousands/uL 348   NRBC AUTO /100 WBCs 0   NEUTROS PCT % 54   LYMPHS PCT % 37   MONOS PCT % 7   EOS PCT % 1   BASOS PCT % 1   NEUTROS ABS Thousands/µL 4 92   LYMPHS ABS Thousands/µL 3 29   MONOS ABS Thousand/µL 0 60   EOS ABS Thousand/µL 0 10   , Chemistry Profile:   Results from last 7 days   Lab Units 06/27/19  1158   POTASSIUM mmol/L 4 5   CHLORIDE mmol/L 107   CO2 mmol/L 25   BUN mg/dL 25   CREATININE mg/dL 1 05   CALCIUM mg/dL 10 4*   EGFR ml/min/1 73sq m 75   , Coagulation Studies:   , Cardiac Studies:   , Additional Labs:   , iSTAT CHEM 8:   Results from last 7 days   Lab Units 06/27/19  1158   ANION GAP mmol/L 7   EGFR ml/min/1 73sq m 75   HEMOGLOBIN g/dL 17 3*   , ABG:   , Toxicology:   , Last A1C/Lipid Panel/Thyroid Panel: No results found for: HGBA1C, TRIG, CHOL, HDL, LDLCALC, [id number], T3FREE, V7OBKBH, FREET4    Imaging: I have personally reviewed pertinent films in PACS  Xr Spine Lumbar Complete W Bending Minimum 6 Views    Result Date: 6/14/2019  Narrative: LUMBAR SPINE INDICATION:   M54 5: Low back pain M48 062: Spinal stenosis, lumbar region with neurogenic claudication M54 16: Radiculopathy, lumbar region  Left-sided low back pain, hip pain, groin pain after lifting heavy furniture since August 2018  COMPARISON:  Abdomen and pelvic CTs performed on 1/16/2019 and 1/13/2015, both at [facility]  VIEWS:  XR SPINE LUMBAR COMPLETE W BENDING MINIMUM 6 VIEWS FINDINGS: There is no evidence of acute fracture or destructive osseous lesion  Mild retrolisthesis of L2 on L3  No evidence of instability during flexion/extension  There are interbody graft cages at L4-L5 and L5-S1  There is severe degenerative disc space narrowing at L2-L3  There is a sclerotic lesion in the left iliac wing, which has been stable since 1/13/2015, therefore benign  Most likely, this is surgically placed bone cement  The pedicles appear intact  Soft tissues are unremarkable  Impression: No acute osseous abnormality  Degenerative changes as described  Workstation performed: [id number]     Ct Hip Left Without Contrast    Result Date: 6/27/2019  Narrative: CT left hip with IV contrast INDICATION: Hip pain, xray arthritis, infection not suspected  COMPARISON: 1/16/2019  TECHNIQUE: CT examination of the was performed    This examination, like all CT scans performed in the [facility], was performed utilizing techniques to minimize radiation dose exposure, including the use of iterative reconstruction and automated exposure control software  Sagittal and coronal two dimensional reconstructed images were also submitted for interpretation  IV Contrast: Rad dose  388 mGy-cm FINDINGS: OSSEOUS STRUCTURES:  No fracture, dislocation or destructive osseous lesion  Postoperative changes involving the left iliac crest noted  L4-5 L5-S1 fusion noted  VISUALIZED MUSCULATURE:  Unremarkable  SOFT TISSUES:  Unremarkable  OTHER PERTINENT FINDINGS:  None  Impression: No acute osseous abnormality  Workstation performed: [id number]     Xr Hips Bilateral 3-4 Vw W Pelvis If Performed    Result Date: 6/14/2019  Narrative: BILATERAL HIPS AND PELVIS INDICATION:   M25 552: Pain in left hip  Left-sided low back pain, hip pain, groin pain after lifting heavy furniture since August 2018  COMPARISON:  None VIEWS:  XR HIPS BILATERAL 3-4 VW W PELVIS IF PERFORMED  FINDINGS: The bony pelvis appears intact  Please see separate lumbar spine report for evaluation of the lumbar spine  There is a sclerotic lesion in the left iliac wing  There is a sclerotic lesion in the left iliac wing, which has been stable since 1/13/2015, therefore benign  Most likely, this is surgically placed bone cement  LEFT HIP: Mild left hip osteoarthritis  This is evidenced by marginal osteophytes  Soft tissues are unremarkable  RIGHT HIP: Mild right hip osteoarthritis  This is evidenced by marginal osteophytes  Soft tissues are unremarkable  Impression: No acute osseous abnormality  Mild osteoarthritis in both hips  Workstation performed: [id number]     Urinalysis:       Invalid input(s): URIBILINOGEN     Urine Micro:        EKG, Pathology, and Other Studies: I have personally reviewed pertinent reports        Medications given in Emergency Department     Medication Administration - last 24 hours from 06/26/2019 1613 to 06/27/2019 1613       Date/Time Order Dose Route Action Action by     06/27/2019 1155 fentanyl citrate (PF) 100 MCG/2ML 50 mcg 50 mcg Intravenous Given Loren Doherty RN     06/27/2019 1328 fentanyl citrate (PF) 100 MCG/2ML 50 mcg 50 mcg Intravenous Given Loren Doherty RN     06/27/2019 1449 ketorolac (TORADOL) injection 15 mg 15 mg Intravenous Given Loren Doherty RN          Assessment and Plan     1  Left Hip Pain  · Likely secondary to osteoarthritis the left hip with trochanteric bursitis  Status post steroid injection  Patient with acute complaints of left hip pain following injection  States that the pain radiates down his left anterior thigh to his patella  CT of the left hip without contrast shows no acute osseous abnormality  Patient is hemodynamically stable  Laboratory analysis unremarkable  · Dilaudid 0 5 mg IV every 4 hours as needed for severe pain/breakthrough pain  · Toradol 30 mg IV every 6 hours as needed for moderate pain  · Continue home gabapentin 600 mg PO TID  · PT/OT evaluation and treat  · Consider orthopedic surgery consultation for possible aspiration of the bursa with analysis of fluid  · To also consider MRI arthrogram during this hospital visit  · Continue to monitor clinically    2  Tobacco Abuse Disorder  · Patient states that he currently smokes 10 cigarettes per day  He is not interested in quitting at this time  · Nicotine patch 21 mg/24 hr  · Counseled on smoking cessation    Code Status: Level 1 - Full Code  VTE Pharmacologic Prophylaxis: Enoxaparin (Lovenox)   VTE Mechanical Prophylaxis: reason for no mechanical VTE prophylaxis Up and OOB as tolerated  Admission Status: OBSERVATION    Admission Time  I spent 30 minutes admitting the patient  This involved direct patient contact where I performed a full history and physical, reviewing previous records, and reviewing laboratory and other diagnostic studies      Froy Barrera DO  Internal Medicine  PGY-1

## 2019-06-28 ENCOUNTER — APPOINTMENT (OUTPATIENT)
Dept: RADIOLOGY | Facility: HOSPITAL | Age: 64
End: 2019-06-28
Payer: MEDICARE

## 2019-06-28 VITALS
BODY MASS INDEX: 25.62 KG/M2 | DIASTOLIC BLOOD PRESSURE: 61 MMHG | TEMPERATURE: 97.9 F | OXYGEN SATURATION: 97 % | HEART RATE: 77 BPM | SYSTOLIC BLOOD PRESSURE: 97 MMHG | HEIGHT: 71 IN | RESPIRATION RATE: 18 BRPM | WEIGHT: 183 LBS

## 2019-06-28 LAB
ANION GAP SERPL CALCULATED.3IONS-SCNC: 6 MMOL/L (ref 4–13)
BASOPHILS # BLD AUTO: 0.06 THOUSANDS/ΜL (ref 0–0.1)
BASOPHILS NFR BLD AUTO: 1 % (ref 0–1)
BUN SERPL-MCNC: 33 MG/DL (ref 5–25)
CALCIUM SERPL-MCNC: 9.1 MG/DL (ref 8.3–10.1)
CHLORIDE SERPL-SCNC: 108 MMOL/L (ref 100–108)
CO2 SERPL-SCNC: 28 MMOL/L (ref 21–32)
CREAT SERPL-MCNC: 0.89 MG/DL (ref 0.6–1.3)
EOSINOPHIL # BLD AUTO: 0.12 THOUSAND/ΜL (ref 0–0.61)
EOSINOPHIL NFR BLD AUTO: 2 % (ref 0–6)
ERYTHROCYTE [DISTWIDTH] IN BLOOD BY AUTOMATED COUNT: 14.3 % (ref 11.6–15.1)
GFR SERPL CREATININE-BSD FRML MDRD: 90 ML/MIN/1.73SQ M
GLUCOSE SERPL-MCNC: 103 MG/DL (ref 65–140)
HCT VFR BLD AUTO: 50.3 % (ref 36.5–49.3)
HGB BLD-MCNC: 16 G/DL (ref 12–17)
IMM GRANULOCYTES # BLD AUTO: 0.02 THOUSAND/UL (ref 0–0.2)
IMM GRANULOCYTES NFR BLD AUTO: 0 % (ref 0–2)
LYMPHOCYTES # BLD AUTO: 3.47 THOUSANDS/ΜL (ref 0.6–4.47)
LYMPHOCYTES NFR BLD AUTO: 45 % (ref 14–44)
MCH RBC QN AUTO: 31.7 PG (ref 26.8–34.3)
MCHC RBC AUTO-ENTMCNC: 31.8 G/DL (ref 31.4–37.4)
MCV RBC AUTO: 100 FL (ref 82–98)
MONOCYTES # BLD AUTO: 0.6 THOUSAND/ΜL (ref 0.17–1.22)
MONOCYTES NFR BLD AUTO: 8 % (ref 4–12)
NEUTROPHILS # BLD AUTO: 3.49 THOUSANDS/ΜL (ref 1.85–7.62)
NEUTS SEG NFR BLD AUTO: 44 % (ref 43–75)
NRBC BLD AUTO-RTO: 0 /100 WBCS
PLATELET # BLD AUTO: 275 THOUSANDS/UL (ref 149–390)
PMV BLD AUTO: 9.3 FL (ref 8.9–12.7)
POTASSIUM SERPL-SCNC: 4.3 MMOL/L (ref 3.5–5.3)
RBC # BLD AUTO: 5.04 MILLION/UL (ref 3.88–5.62)
SODIUM SERPL-SCNC: 142 MMOL/L (ref 136–145)
WBC # BLD AUTO: 7.76 THOUSAND/UL (ref 4.31–10.16)

## 2019-06-28 PROCEDURE — G8988 SELF CARE GOAL STATUS: HCPCS

## 2019-06-28 PROCEDURE — G8979 MOBILITY GOAL STATUS: HCPCS

## 2019-06-28 PROCEDURE — 80048 BASIC METABOLIC PNL TOTAL CA: CPT | Performed by: INTERNAL MEDICINE

## 2019-06-28 PROCEDURE — G8978 MOBILITY CURRENT STATUS: HCPCS

## 2019-06-28 PROCEDURE — 97166 OT EVAL MOD COMPLEX 45 MIN: CPT

## 2019-06-28 PROCEDURE — 85025 COMPLETE CBC W/AUTO DIFF WBC: CPT | Performed by: INTERNAL MEDICINE

## 2019-06-28 PROCEDURE — 97163 PT EVAL HIGH COMPLEX 45 MIN: CPT

## 2019-06-28 PROCEDURE — G8987 SELF CARE CURRENT STATUS: HCPCS

## 2019-06-28 PROCEDURE — 99217 PR OBSERVATION CARE DISCHARGE MANAGEMENT: CPT | Performed by: INTERNAL MEDICINE

## 2019-06-28 PROCEDURE — NC001 PR NO CHARGE: Performed by: INTERNAL MEDICINE

## 2019-06-28 PROCEDURE — G8989 SELF CARE D/C STATUS: HCPCS

## 2019-06-28 RX ORDER — KETOROLAC TROMETHAMINE 10 MG/1
10 TABLET, FILM COATED ORAL EVERY 6 HOURS PRN
Qty: 30 TABLET | Refills: 2 | Status: SHIPPED | OUTPATIENT
Start: 2019-06-28 | End: 2019-08-22 | Stop reason: ALTCHOICE

## 2019-06-28 RX ORDER — OXYCODONE HYDROCHLORIDE 5 MG/1
5 TABLET ORAL EVERY 6 HOURS PRN
Status: DISCONTINUED | OUTPATIENT
Start: 2019-06-28 | End: 2019-06-28

## 2019-06-28 RX ORDER — LIDOCAINE 50 MG/G
1 PATCH TOPICAL DAILY
Qty: 30 PATCH | Refills: 0 | Status: SHIPPED | OUTPATIENT
Start: 2019-06-28 | End: 2019-08-22 | Stop reason: HOSPADM

## 2019-06-28 RX ORDER — OXYCODONE HYDROCHLORIDE 5 MG/1
5 TABLET ORAL DAILY PRN
Qty: 7 TABLET | Refills: 0 | Status: SHIPPED | OUTPATIENT
Start: 2019-06-28 | End: 2019-07-05

## 2019-06-28 RX ORDER — HYDROMORPHONE HCL/PF 1 MG/ML
0.5 SYRINGE (ML) INJECTION EVERY 4 HOURS PRN
Status: DISCONTINUED | OUTPATIENT
Start: 2019-06-28 | End: 2019-06-28

## 2019-06-28 RX ORDER — OXYCODONE HYDROCHLORIDE 5 MG/1
5 TABLET ORAL DAILY PRN
Status: DISCONTINUED | OUTPATIENT
Start: 2019-06-28 | End: 2019-06-28 | Stop reason: HOSPADM

## 2019-06-28 RX ORDER — HYDROMORPHONE HCL/PF 1 MG/ML
0.5 SYRINGE (ML) INJECTION EVERY 6 HOURS PRN
Status: DISCONTINUED | OUTPATIENT
Start: 2019-06-28 | End: 2019-06-28 | Stop reason: HOSPADM

## 2019-06-28 RX ADMIN — HYDROMORPHONE HYDROCHLORIDE 0.5 MG: 1 INJECTION, SOLUTION INTRAMUSCULAR; INTRAVENOUS; SUBCUTANEOUS at 11:06

## 2019-06-28 RX ADMIN — ENOXAPARIN SODIUM 40 MG: 40 INJECTION SUBCUTANEOUS at 08:47

## 2019-06-28 RX ADMIN — OXYCODONE HYDROCHLORIDE 5 MG: 5 TABLET ORAL at 08:53

## 2019-06-28 RX ADMIN — ACETAMINOPHEN 975 MG: 325 TABLET ORAL at 04:45

## 2019-06-28 RX ADMIN — DULOXETINE HYDROCHLORIDE 60 MG: 60 CAPSULE, DELAYED RELEASE ORAL at 08:49

## 2019-06-28 RX ADMIN — METHOCARBAMOL 500 MG: 500 TABLET, FILM COATED ORAL at 04:52

## 2019-06-28 RX ADMIN — OXYCODONE HYDROCHLORIDE 5 MG: 5 TABLET ORAL at 04:45

## 2019-06-28 RX ADMIN — GABAPENTIN 600 MG: 300 CAPSULE ORAL at 08:49

## 2019-06-28 RX ADMIN — LIDOCAINE 1 PATCH: 50 PATCH CUTANEOUS at 08:49

## 2019-06-28 NOTE — PHYSICAL THERAPY NOTE
Physical Therapy Evaluation     Patient's Name: nAahy Gavin    Admitting Diagnosis  Leg pain [M79 606]  Left leg pain [M79 605]  Ambulatory dysfunction [R26 2]    Problem List  Patient Active Problem List   Diagnosis    Leg pain    History of resection of meningioma    Trochanteric bursitis    Tobacco abuse       Past Medical History  Past Medical History:   Diagnosis Date    COPD (chronic obstructive pulmonary disease) ([facility] [address] )        Past Surgical History  Past Surgical History:   Procedure Laterality Date    SPINE SURGERY          06/28/19 1140   Note Type   Note type Eval only   Pain Assessment   Pain Assessment 0-10   Pain Score 4   Pain Type Acute pain   Pain Location Hip;Leg   Pain Orientation Left   Hospital Pain Intervention(s) Repositioned; Ambulation/increased activity   Home Living   Type of [address] One level;Stairs to enter with rails  (4 ALONSO)   Bathroom Shower/Tub Tub/shower unit   Bathroom Toilet Standard   Bathroom Equipment   (no DME PTA)   [address] [facility]  (pt recently purchased cane however has not used)   Prior Function   Level of [place] Independent with ADLs and functional mobility   Lives With Alone   Receives Help From Family;Friend(s)   ADL Assistance Independent   IADLs Independent   Falls in the last 6 months 0   Vocational Retired   Comments pt lives alone in first floor apt  could receive help from family if needed   Restrictions/Precautions   Weight Bearing Precautions Per Order No   Other Precautions Fall Risk;Pain   Cognition   Overall Cognitive Status WFL   Arousal/Participation Alert   Attention Attends with cues to redirect   Orientation Level Oriented X4   Memory Within functional limits   Following Commands Follows all commands and directions without difficulty   RLE Assessment   RLE Assessment WFL   LLE Assessment   LLE Assessment X  (limited weight bearing during transfers 2* pain)   Strength LLE   LLE Overall Strength 3/5  (pt limited by pain)   Coordination   Movements are Fluid and Coordinated 1   Sensation WFL   Bed Mobility   Supine to Sit 5  Supervision   Additional items Increased time required;Verbal cues;HOB elevated; Bedrails   Transfers   Sit to Stand 5  Supervision   Additional items Increased time required;Armrests; Verbal cues   Stand to Sit 5  Supervision   Additional items Increased time required;Verbal cues;Armrests   Ambulation/Elevation   Gait pattern Improper Weight shift; Antalgic; Forward Flexion;Decreased foot clearance; Inconsistent jozef; Short stride; Step to;Excessively slow   Gait Assistance 5  Supervision   Additional items Verbal cues  (pt limited by pain)   Assistive Device Rolling walker   Distance 5ft   Stair Management Assistance Not tested   Ramp Technique Not tested   Ramp Assistance Not tested   Balance   Static Sitting Good   Dynamic Sitting Fair +   Static Standing Fair +   Dynamic Standing Fair   Ambulatory Fair   Endurance Deficit   Endurance Deficit Yes   Endurance Deficit Description pain limiting in LLE   Activity Tolerance   Activity Tolerance Patient limited by pain   Nurse Made Aware nsg gave clearance to see pt   Assessment   Prognosis Good   Problem List Decreased strength;Decreased endurance; Impaired balance;Decreased mobility;Pain   Assessment Pt is a 58 y o  Male admitted to [facility] for worsening L hip and leg pain that began after a Cortizone shot to the L hip  Pt with a history of LBP extending into groin and LLE  Pt with other significant PMH including COPD and meningioma resection  He was seen today for a PT evaluation, found supine in bed awake and agreeable to therapy  Pt reporting 4/10 pain at rest, reporting 7/10 pain prior to meds given by nurse  With standing pt reporting 10/10 pain in leg making him unable to ambulate longer than bed to chair  His pain extends from the groin down the anterior leg to the knee, pain is not extending below the knee   Pt is able to complete bed mobility and transfers with RW with supervision, he is able to ambulate short distance bed to chair before requiring seated break 2* to pain, he reports that at home prior to coming to hospital his LLE was buckling causing him to nearly fall 2 times although no instances of buckling during gait trial  During gait pt using step to pattern with poor weight bearing through LLE  He is unable to ambulate household distances and unable to attempt stair training 2* to increased pain  Barthel index is currently 65/100  PT recommendation is to d/c to inpt rehab vs home pending improved pain management allowing progress with gait and stair training  PT equipment recommendation is to utilize RW at this time for transfers and ambulation, will progress to less restrictive device pending pt ability to increase weight bearing through LLE  Pt is OK to d/c to inpt rehab when medically cleared, NOT OK to d/c home unless able to show progress with ambulation and stair negotiation  Barriers to Discharge Inaccessible home environment;Decreased caregiver support   Barriers to Discharge Comments lives alone with 4 ALONSO   Goals   Patient Goals to reduce leg pain    STG Expiration Date 07/05/19   Short Term Goal #1 1) Pt will complete bed mobility ind to reduce burden of care  2) Pt will transfer ind with no device to decrease burden of care  3) Pt will improve LLE strength to 4/5 to tolerate ambulation and stair training  4) Pt will ambulate mod I with least restrictive assistive device 350ft to move within the community  5) Pt will complete 4 stairs mod I to access apartment building  Plan   Treatment/Interventions Functional transfer training;LE strengthening/ROM; Elevations; Therapeutic exercise; Endurance training;Patient/family training;Equipment eval/education;Gait training;Spoke to nursing   PT Frequency   (3-5x/wk)   Recommendation   Recommendation   (inpt rehab vs home pending improved pain, ambulation, stairs)   Equipment Recommended Walker;Cane  (pt owns cane, will need walker pending progress)   PT - OK to Discharge Yes  (to rehab when medically cleared)   Barthel Index   Feeding 10   Bathing 5   Grooming Score 5   Dressing Score 10   Bladder Score 10   Bowels Score 10   Toilet Use Score 5   Transfers (Bed/Chair) Score 10   Mobility (Level Surface) Score 0   Stairs Score 0   Barthel Index Score [address] [facility]

## 2019-06-28 NOTE — DISCHARGE SUMMARY
[facility] Discharge Summary - Medical Chano Marr 58 y o  male MRN: [id number]    [address] Room / Bed: [facility] 340/[facility] 340-01 Encounter: [id number]    BRIEF OVERVIEW    Admitting Provider: Angel Perkins MD  Discharge Provider: Angel Perkins MD  Primary Care Physician at Discharge: [address]    Discharge To: Home    Admission Date: 6/27/2019     Discharge Date: 6/28/2019    Primary Discharge Diagnosis  Principal Problem:    Leg pain  Active Problems:    History of resection of meningioma    Trochanteric bursitis    Tobacco abuse  Resolved Problems:    * No resolved hospital problems  *    Diagnostic Procedures Performed  Ct Hip Left Without Contrast    Result Date: 6/27/2019  Impression: No acute osseous abnormality  Workstation performed: [id number]     Discharge Disposition: Final discharge disposition not confirmed  Discharged With Lines: no    Test Results Pending at Discharge: N/A    Outpatient Follow-Up  PCP    Follow up with consulting providers  Pain Management    Active Issues Requiring Follow-up   Osteoarthritis    Code Status: Level 1 - Full Code  Advance Directive and Living Will: <no information>  Power of :    POLST:      Medications   See after visit summary for reconciled discharge medications provided to patient and family  Allergies  Allergies   Allergen Reactions    Cefadroxil Other (See Comments) and Rash    Codeine Other (See Comments)     "sick to stomach"      Iodinated Diagnostic Agents Anaphylaxis    Sulfa Antibiotics Anaphylaxis and GI Intolerance    Clindamycin Rash     Discharge Diet: regular diet  Activity restrictions: none    [address]  is a 58 y o  male with a past medical history significant for nicotine dependence, chronic left hip pain, low back pain with multiple previous interventions, meningioma status post resection who presents with complaints of acute left hip pain  The patient states that the pain radiates down to his left groin and buttocks  The patient underwent a left trochanteric bursa steroid injection on 06/17/2019  Shortly thereafter, the patient states that he began to experience severe left-sided hip pain at the site of injection  He states that he feels extreme tenderness to palpation of the left hip region  The patient has had numerous steroid injections which usually provide relief      In the ED, the patient was found to be hemodynamically stable  Laboratory analysis was unremarkable and imaging of the left hip with CT scan was negative for acute osseous pathology  The pain was not relieved by fentanyl in the ED  Of note, the patient was scheduled to have an MRI performed sometime in July per Orthopedic surgery      The patient states that he smokes 10 cigarettes per day  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   He denies alcohol abuse or illicit drug use  He endorses no significant family history  The patient was admitted to the general medicine service and was treated as follows:    1  Left Hip Pain  · Likely secondary to osteoarthritis of the left hip with trochanteric bursitis   Status post steroid injection   Patient with acute complaints of left hip pain following injection   States that the pain radiates down his left anterior thigh to his patella   CT of the left hip without contrast shows no acute osseous abnormality   Patient is hemodynamically stable   Laboratory analysis unremarkable  § Dilaudid 0 5 mg IV every 4 hours as needed for breakthrough pain  § Oxycodone 5 mg PO every 4 hours as needed for severe pain  § Toradol 30 mg IV every 6 hours as needed for moderate pain  § Continue home Gabapentin 600 mg PO TID  § Robaxin 500 mg PO every 6 hours as needed for muscle spasms  § PT/OT evaluation and treat  § Fluoroscopic-guided FNA of left hip joint with IR today for fluid analysis  § Continue to monitor clinically     2  Tobacco Abuse Disorder  · Patient states that he currently smokes 10 cigarettes per day  [facility] is not interested in quitting at this time  § Nicotine patch 21 mg/24 hr  § Counseled on smoking cessation        Disposition: Observation for PT/OT assessment    Presenting Problem/History of Present Illness  Principal Problem:    Leg pain  Active Problems:    History of resection of meningioma    Trochanteric bursitis    Tobacco abuse  Resolved Problems:    * No resolved hospital problems  *      Discharge Condition: stable    Discharge  Statement   I spent 30 minutes minutes discharging the patient  This time was spent on the day of discharge  I had direct contact with the patient on the day of discharge  Additional documentation is required if more than 30 minutes were spent on discharge

## 2019-06-28 NOTE — PLAN OF CARE
Problem: Potential for Falls  Goal: Patient will remain free of falls  Description  INTERVENTIONS:  - Assess patient frequently for physical needs  -  Identify cognitive and physical deficits and behaviors that affect risk of falls    -  [place] fall precautions as indicated by assessment   - Educate patient/family on patient safety including physical limitations  - Instruct patient to call for assistance with activity based on assessment  - Modify environment to reduce risk of injury  - Consider OT/PT consult to assist with strengthening/mobility  Outcome: Progressing     Problem: PAIN - ADULT  Goal: Verbalizes/displays adequate comfort level or baseline comfort level  Description  Interventions:  - Encourage patient to monitor pain and request assistance  - Assess pain using appropriate pain scale  - Administer analgesics based on type and severity of pain and evaluate response  - Implement non-pharmacological measures as appropriate and evaluate response  - Consider cultural and social influences on pain and pain management  - Notify physician/advanced practitioner if interventions unsuccessful or patient reports new pain  Outcome: Progressing     Problem: INFECTION - ADULT  Goal: Absence or prevention of progression during hospitalization  Description  INTERVENTIONS:  - Assess and monitor for signs and symptoms of infection  - Monitor lab/diagnostic results  - Monitor all insertion sites, i e  indwelling lines, tubes, and drains  - Monitor endotracheal (as able) and nasal secretions for changes in amount and color  - [place] appropriate cooling/warming therapies per order  - Administer medications as ordered  - Instruct and encourage patient and family to use good hand hygiene technique  - Identify and instruct in appropriate isolation precautions for identified infection/condition  Outcome: Progressing     Problem: SAFETY ADULT  Goal: Maintain or return to baseline ADL function  Description  INTERVENTIONS:  -  Assess patient's ability to carry out ADLs; assess patient's baseline for ADL function and identify physical deficits which impact ability to perform ADLs (bathing, care of mouth/teeth, toileting, grooming, dressing, etc )  - Assess/evaluate cause of self-care deficits   - Assess range of motion  - Assess patient's mobility; develop plan if impaired  - Assess patient's need for assistive devices and provide as appropriate  - Encourage maximum independence but intervene and supervise when necessary  ¯ Involve family in performance of ADLs  ¯ Assess for home care needs following discharge   ¯ Request OT consult to assist with ADL evaluation and planning for discharge  ¯ Provide patient education as appropriate  Outcome: Progressing  Goal: Maintain or return mobility status to optimal level  Description  INTERVENTIONS:  - Assess patient's baseline mobility status (ambulation, transfers, stairs, etc )    - Identify cognitive and physical deficits and behaviors that affect mobility  - Identify mobility aids required to assist with transfers and/or ambulation (gait belt, sit-to-stand, lift, walker, cane, etc )  - [place] fall precautions as indicated by assessment  - Record patient progress and toleration of activity level on Mobility SBAR; progress patient to next Phase/Stage  - Instruct patient to call for assistance with activity based on assessment  - Request Rehabilitation consult to assist with strengthening/weightbearing, etc   Outcome: Progressing     Problem: DISCHARGE PLANNING  Goal: Discharge to home or other facility with appropriate resources  Description  INTERVENTIONS:  - Identify barriers to discharge w/patient and caregiver  - Arrange for needed discharge resources and transportation as appropriate  - Identify discharge learning needs (meds, wound care, etc )  - Arrange for interpretive services to assist at discharge as needed  - Refer to Case Management Department for coordinating discharge planning if the patient needs post-hospital services based on physician/advanced practitioner order or complex needs related to functional status, cognitive ability, or social support system  Outcome: Progressing

## 2019-06-28 NOTE — OCCUPATIONAL THERAPY NOTE
[address]  Evaluation     Patient Name: Anna Oswald  PVITO'A Date: 6/28/2019  Problem List  Patient Active Problem List   Diagnosis    Leg pain    History of resection of meningioma    Trochanteric bursitis    Tobacco abuse     Past Medical History  Past Medical History:   Diagnosis Date    COPD (chronic obstructive pulmonary disease) ([address] )      Past Surgical History  Past Surgical History:   Procedure Laterality Date    SPINE SURGERY               06/28/19 1139   Note Type   Note type Eval only   Restrictions/Precautions   Weight Bearing Precautions Per Order No   Other Precautions Fall Risk;Pain   Pain Assessment   Pain Assessment 0-10   Pain Score 4   Pain Type Acute pain   Pain Location Hip;Leg   Hospital Pain Intervention(s) Ambulation/increased activity; Rest;Repositioned   Home Living   Type of [address] [facility] One level  (4 ALONSO)   Bathroom Shower/Tub Tub/shower unit   Bathroom Toilet Standard   Bathroom Equipment   (Denies DME)   [address] [facility]  (Denies RW use PTA)   Prior Function   Level of [place] Independent with ADLs and functional mobility   Lives With Alone   Receives Help From Family;Friend(s)   ADL Assistance Independent   IADLs Independent   Falls in the last 6 months 0   Vocational Retired   Lifestyle   Autonomy I ADls/IADLs, (-) AD for functional mobility/transfers, (+) driving   Reciprocal Relationships Family (nieces and nephews live close)    Service to Others Retired   [address] ([address]) [address] and cooperative   ADL   Where Assessed Supine, bed   Eating Assistance 7  Independent   Grooming Assistance 7  Independent   UB Bathing Assistance 7  Independent   LB Bathing Assistance 6  Modified Independent   LB Bathing Deficit Increased time to complete   UB Dressing Assistance 7  Independent   LB Dressing Assistance 6  Modified independent   LB Dressing Deficit Increased time to complete; Don/doff R sock; Don/doff L sock; Thread RLE into pants; Thread LLE into pants  (able to don and doff R&L sock and pants)   Toileting Assistance  6  Modified independent   Toileting Deficit Increased time to complete   Bed Mobility   Supine to Sit 5  Supervision   Additional items Increased time required;HOB elevated; Bedrails   Sit to Supine Unable to assess   Additional Comments left pt in chair post session with all needs in reach    Transfers   Sit to Stand 5  Supervision   Additional items Increased time required; Bedrails;Verbal cues  (verbal cues for hand placement )   Stand to Sit 5  Supervision   Additional items Increased time required;Verbal cues  (verbal cues for hand placement )   Functional Mobility   Functional Mobility 5  Supervision   Additional Comments Pt able to take ~4-5 steps with RW from bed>chair; pt required S for functional mobility; verbal cues for RW management    Additional items Rolling walker   Balance   Static Sitting Good   Dynamic Sitting Fair +   Static Standing Fair +   Dynamic Standing Fair   Ambulatory Fair   Activity Tolerance   Activity Tolerance Patient limited by pain   Medical Staff Made Aware  [address] cleared pt for therapy    RUE Assessment   RUE Assessment WFL   LUE Assessment   LUE Assessment WFL   Hand Function   Gross Motor Coordination Functional   Fine Motor Coordination Functional   Vision-Basic Assessment   Current Vision Wears glasses all the time   Vision - Complex Assessment   Acuity Able to read clock/calendar on wall without difficulty   Cognition   Overall Cognitive Status [facility]   Arousal/Participation Alert; Cooperative   Attention Attends with cues to redirect   Orientation Level Oriented X4   Memory Within functional limits   Following Commands Follows multistep commands with increased time or repetition   Comments Pt pleasant and cooperative; AAOx4; pt demonstrated F + safety awareness t/o session; educated pt on RW management; pt receptive    Assessment   Limitation Decreased high-level ADLs; Decreased self-care trans;Decreased endurance   Prognosis Fair   Assessment Pt is a 58 y o  male who was [facility] to [facility] on 6/27/2019 with Leg pain, Trochanteric bursitis, Tobacco abuse, CT Left Hip- no acute abnormalities  Pt's problem list also includes PMH of COPD, Hx of resection of meningioma  At baseline pt was completing I ADLS/IADLS, (-) AD for functional mobility/transfers, (+) driving  Pt lives alone in a one story apartment with 4 ALONSO  Currently pt requires Mod I  for overall ADLS and S for functional mobility/transfers  Pt currently presents with impairments in the following categories -steps to enter environment, limited home support, difficulty performing IADLS  and health management  activity tolerance, endurance and standing balance/tolerance  These impairments, as well as pt's fatigue, pain and risk for falls  limit pt's ability to safely engage in all baseline areas of occupation, includingfunctional mobility/transfers, community mobility, laundry , driving, house maintenance and meal prep From OT standpoint, recommend Home with family and friend support upon D/C   No further acute OT needs indicated at this time - Recommend continued oob for meals, ambulation to/from BR, setup for self care tasks and mobility in hallway with nursing/restorative - d/c from caseload with above recommendations   Goals   Patient Goals To go home   Recommendation   OT Discharge Recommendation Home with family support   OT - OK to Discharge Yes  (When medically cleared)      Rachel Mcrae

## 2019-06-28 NOTE — PROGRESS NOTES
IM Residency Progress Note   Unit/Bed#: CW3 340-01 Encounter: [id number]  SOD Team A      Rajesh Liz 58 y o  male [id number]    Hospital Stay Days: 0      Assessment/Plan:    1  Left Hip Pain  · Likely secondary to osteoarthritis of the left hip with trochanteric bursitis  Status post steroid injection  Patient with acute complaints of left hip pain following injection  States that the pain radiates down his left anterior thigh to his patella  CT of the left hip without contrast shows no acute osseous abnormality  Patient is hemodynamically stable  Laboratory analysis unremarkable  § Dilaudid 0 5 mg IV every 4 hours as needed for breakthrough pain  § Oxycodone 5 mg PO every 4 hours as needed for severe pain  § Toradol 30 mg IV every 6 hours as needed for moderate pain  § Continue home Gabapentin 600 mg PO TID  § Robaxin 500 mg PO every 6 hours as needed for muscle spasms  § PT/OT evaluation and treat  § Fluoroscopic-guided FNA of left hip joint with IR today for fluid analysis  § Continue to monitor clinically     2  Tobacco Abuse Disorder  · Patient states that he currently smokes 10 cigarettes per day  He is not interested in quitting at this time  § Nicotine patch 21 mg/24 hr  § Counseled on smoking cessation      Disposition: Observation for PT/OT assessment      Subjective:   Patient seen and examined at bedside  No acute events overnight  Endorses left hip pain this AM  Denies chest pain, SOB, fevers/chills          Vitals: Temp (24hrs), Av 6 °F (37 °C), Min:98 1 °F (36 7 °C), Max:99 1 °F (37 3 °C)  Current: Temperature: 98 1 °F (36 7 °C)  Vitals:    19 1630 19 1700 19 1706 19 2300   BP: 124/81  116/82 95/60   BP Location: Right arm  Right arm Left arm   Pulse: 104  90 73   Resp: 20  18 16   Temp:   99 1 °F (37 3 °C) 98 1 °F (36 7 °C)   TempSrc:   Oral Oral   SpO2: 96% 96% 96% 95%   Weight:   83 kg (183 lb)    Height:   5' 11" (1 803 m)     Body mass index is 25 52 kg/m²  I/O last 24 hours: In: 80 [P O :580]  Out: 600 [Urine:600]    Physical Exam   Constitutional: He is oriented to person, place, and time  He appears well-developed and well-nourished  No distress  HENT:   Head: Normocephalic and atraumatic  Nose: Nose normal    Mouth/Throat: No oropharyngeal exudate  Eyes: Conjunctivae are normal  Right eye exhibits no discharge  Left eye exhibits no discharge  No scleral icterus  Neck: Neck supple  No JVD present  Cardiovascular: Normal rate, regular rhythm, normal heart sounds and intact distal pulses  Exam reveals no gallop and no friction rub  No murmur heard  Pulmonary/Chest: Effort normal and breath sounds normal  No respiratory distress  He has no wheezes  He has no rales  Abdominal: Soft  Bowel sounds are normal  He exhibits no distension  There is no tenderness  There is no rebound and no guarding  Musculoskeletal: Normal range of motion  He exhibits tenderness  He exhibits no edema  Decreased ROM in left lower extremity   Neurological: He is alert and oriented to person, place, and time  Skin: Skin is warm and dry  He is not diaphoretic  No erythema  Psychiatric: He has a normal mood and affect         Invasive Devices     Peripheral Intravenous Line            Peripheral IV 06/27/19 Right Antecubital less than 1 day                  Labs:   Recent Results (from the past 24 hour(s))   CBC and differential    Collection Time: 06/27/19 11:58 AM   Result Value Ref Range    WBC 9 00 4 31 - 10 16 Thousand/uL    RBC 5 33 3 88 - 5 62 Million/uL    Hemoglobin 17 3 (H) 12 0 - 17 0 g/dL    Hematocrit 53 3 (H) 36 5 - 49 3 %     (H) 82 - 98 fL    MCH 32 5 26 8 - 34 3 pg    MCHC 32 5 31 4 - 37 4 g/dL    RDW 14 3 11 6 - 15 1 %    MPV 9 7 8 9 - 12 7 fL    Platelets [phone] - 876 Thousands/uL    nRBC 0 /100 WBCs    Neutrophils Relative 54 43 - 75 %    Immat GRANS % 0 0 - 2 %    Lymphocytes Relative 37 14 - 44 %    Monocytes Relative 7 4 - 12 % Eosinophils Relative 1 0 - 6 %    Basophils Relative 1 0 - 1 %    Neutrophils Absolute 4 92 1 85 - 7 62 Thousands/µL    Immature Grans Absolute 0 04 0 00 - 0 20 Thousand/uL    Lymphocytes Absolute 3 29 0 60 - 4 47 Thousands/µL    Monocytes Absolute 0 60 0 17 - 1 22 Thousand/µL    Eosinophils Absolute 0 10 0 00 - 0 61 Thousand/µL    Basophils Absolute 0 05 0 00 - 0 10 Thousands/µL   Basic metabolic panel    Collection Time: 06/27/19 11:58 AM   Result Value Ref Range    Sodium 139 136 - 145 mmol/L    Potassium 4 5 3 5 - 5 3 mmol/L    Chloride 107 100 - 108 mmol/L    CO2 25 21 - 32 mmol/L    ANION GAP 7 4 - 13 mmol/L    BUN 25 5 - 25 mg/dL    Creatinine 1 05 0 60 - 1 30 mg/dL    Glucose 169 (H) 65 - 140 mg/dL    Calcium 10 4 (H) 8 3 - 10 1 mg/dL    eGFR 75 ml/min/1 73sq m   Sedimentation rate, automated    Collection Time: 06/27/19 11:58 AM   Result Value Ref Range    Sed Rate 7 0 - 10 mm/hour   C-reactive protein    Collection Time: 06/27/19 11:58 AM   Result Value Ref Range    CRP 6 6 (H) <3 0 mg/L   Basic metabolic panel    Collection Time: 06/28/19  4:47 AM   Result Value Ref Range    Sodium 142 136 - 145 mmol/L    Potassium 4 3 3 5 - 5 3 mmol/L    Chloride 108 100 - 108 mmol/L    CO2 28 21 - 32 mmol/L    ANION GAP 6 4 - 13 mmol/L    BUN 33 (H) 5 - 25 mg/dL    Creatinine 0 89 0 60 - 1 30 mg/dL    Glucose 103 65 - 140 mg/dL    Calcium 9 1 8 3 - 10 1 mg/dL    eGFR 90 ml/min/1 73sq m   CBC and differential    Collection Time: 06/28/19  4:47 AM   Result Value Ref Range    WBC 7 76 4 31 - 10 16 Thousand/uL    RBC 5 04 3 88 - 5 62 Million/uL    Hemoglobin 16 0 12 0 - 17 0 g/dL    Hematocrit 50 3 (H) 36 5 - 49 3 %     (H) 82 - 98 fL    MCH 31 7 26 8 - 34 3 pg    MCHC 31 8 31 4 - 37 4 g/dL    RDW 14 3 11 6 - 15 1 %    MPV 9 3 8 9 - 12 7 fL    Platelets [phone] 082 Thousands/uL    nRBC 0 /100 WBCs    Neutrophils Relative 44 43 - 75 %    Immat GRANS % 0 0 - 2 %    Lymphocytes Relative 45 (H) 14 - 44 %    Monocytes Relative 8 4 - 12 %    Eosinophils Relative 2 0 - 6 %    Basophils Relative 1 0 - 1 %    Neutrophils Absolute 3 49 1 85 - 7 62 Thousands/µL    Immature Grans Absolute 0 02 0 00 - 0 20 Thousand/uL    Lymphocytes Absolute 3 47 0 60 - 4 47 Thousands/µL    Monocytes Absolute 0 60 0 17 - 1 22 Thousand/µL    Eosinophils Absolute 0 12 0 00 - 0 61 Thousand/µL    Basophils Absolute 0 06 0 00 - 0 10 Thousands/µL       Radiology Results: I have personally reviewed pertinent reports  Other Diagnostic Testing:   I have personally reviewed pertinent reports          Active Meds:   Current Facility-Administered Medications   Medication Dose Route Frequency    acetaminophen (TYLENOL) tablet 975 mg  975 mg Oral Q8H [address]    diclofenac sodium (VOLTAREN) 1 % topical gel 2 g  2 g Topical 4x Daily    DULoxetine (CYMBALTA) delayed release capsule 60 mg  60 mg Oral Daily    enoxaparin (LOVENOX) subcutaneous injection 40 mg  40 mg Subcutaneous Daily    gabapentin (NEURONTIN) capsule 600 mg  600 mg Oral TID    HYDROmorphone (DILAUDID) injection 0 2 mg  0 2 mg Intravenous Q4H PRN    ketorolac (TORADOL) injection 30 mg  30 mg Intravenous Q6H PRN    lidocaine (LIDODERM) 5 % patch 1 patch  1 patch Topical Daily    methocarbamol (ROBAXIN) tablet 500 mg  500 mg Oral Q6H PRN    nicotine (NICODERM CQ) 21 mg/24 hr TD 24 hr patch 1 patch  1 patch Transdermal Daily    oxyCODONE (ROXICODONE) IR tablet 5 mg  5 mg Oral Q4H PRN         VTE Pharmacologic Prophylaxis: Enoxaparin (Lovenox)  VTE Mechanical Prophylaxis: sequential compression device    Radha Olivares, DO

## 2019-06-28 NOTE — UTILIZATION REVIEW
Initial Clinical Review    Admission: Date/Time/Statement: OBS  6/27 1539    Orders Placed This Encounter   Procedures    Place in Observation     Standing Status:   Standing     Number of Occurrences:   1     Order Specific Question:   Admitting Physician     Answer:   Ernesto Garcia     Order Specific Question:   Level of Care     Answer:   Med Surg [16]     ED Arrival Information     Expected Arrival Acuity Means of Arrival Escorted By Service Admission Type    - 6/27/2019 10:41 Urgent Walk-In Self General Medicine Urgent    Arrival Complaint    -        Chief Complaint   Patient presents with    Leg Pain     Pt c/o left leg pain  Pt has seen multiple doctors for this  [facility] neurosurgeon told his is was his back and needed surgery but pt snokes so she would not perform surgery  He then saw a [facility] who said maybe it is not his back and maybe it is his hip  He then saw an ortho specialist who ordered MRI but when he called to schedule the MRI they couldnt do it until July and he states eh cant wait in pain that long     Assessment/Plan: 58 y o  male with a past medical history significant for nicotine dependence, chronic left hip pain, low back pain with multiple previous interventions, meningioma status post resection who presents with complaints of acute left hip pain  The patient states that the pain radiates down to his left groin and buttocks  The patient underwent a left trochanteric bursa steroid injection on 06/17/2019  Shortly thereafter, the patient states that he began to experience severe left-sided hip pain at the site of injection  He states that he feels extreme tenderness to palpation of the left hip region  The patient has had numerous steroid injections which usually provide relief      1  Left Hip Pain  · Likely secondary to osteoarthritis the left hip with trochanteric bursitis  Status post steroid injection  Patient with acute complaints of left hip pain following injection  States that the pain radiates down his left anterior thigh to his patella  CT of the left hip without contrast shows no acute osseous abnormality  Patient is hemodynamically stable  Laboratory analysis unremarkable  § Dilaudid 0 5 mg IV every 4 hours as needed for severe pain/breakthrough pain  § Toradol 30 mg IV every 6 hours as needed for moderate pain  § Continue home gabapentin 600 mg PO TID  § PT/OT evaluation and treat  § Consider orthopedic surgery consultation for possible aspiration of the bursa with analysis of fluid  § To also consider MRI arthrogram during this hospital visit  § Continue to monitor clinically     2  Tobacco Abuse Disorder  · Patient states that he currently smokes 10 cigarettes per day  He is not interested in quitting at this time    § Nicotine patch 21 mg/24 hr  § Counseled on smoking cessation     ED Triage Vitals   Temperature Pulse Respirations Blood Pressure SpO2   06/27/19 1133 06/27/19 1055 06/27/19 1055 06/27/19 1055 06/27/19 1055   98 5 °F (36 9 °C) (!) 118 20 120/79 97 %      Temp Source Heart Rate Source Patient Position - Orthostatic VS BP Location FiO2 (%)   06/27/19 1133 06/27/19 1055 06/27/19 1055 06/27/19 1055 --   Oral Monitor Lying Right arm       Pain Score       06/27/19 1055       Worst Possible Pain        Wt Readings from Last 1 Encounters:   06/27/19 83 kg (183 lb)     Additional Vital Signs:   06/27/19 2300  98 1 °F (36 7 °C)  73  16  95/60  95 %  None (Room air)  Lying   06/27/19 2057  --  --  --  --  --  None (Room air)  --   06/27/19 1706  99 1 °F (37 3 °C)  90  18  116/82  96 %  --  Lying   06/27/19 1700  --  --  --  --  96 %  None (Room air)  --   06/27/19 1630  --  104  20  124/81  96 %  None (Room air)  Lying   06/27/19 1626  --  101   20   124/81   96 %   None (Room air)   Lying      06/27/19 1530  --  86  20  131/69  92 %  None (Room air)  Lying   06/27/19 1500  --  --  --  152/85  --  --  --   06/27/19 1455  --  96  18  148/104Abnormal   95 %  None (Room air)  Lying   06/27/19 1400  --  92  16  130/86  95 %  None (Room air)  Lying   06/27/19 1326  --  113Abnormal   18  122/78  95 %  None (Room air)  Lying   06/27/19 1315  --  92  16  127/88  95 %  None (Room air)  Lying   06/27/19 1245  --  92  14  120/76  95 %  None (Room air)  Lying   06/27/19 1200  --  108Abnormal   22  134/89  97 %  None (Room air)  Lying   06/27/19 1150  --  123Abnormal   22  120/91  98 %  None (Room air)  Lying   06/27/19 1133  98 5 °F (36 9 °C)  --  --  --  --  --  --   06/27/19 1115  --  110Abnormal   20  120/79  98 %  None (Room air)  Lying   06/27/19 1112  --  119Abnormal   20  120/79  98 %  None (Room air)  Sitting     Pertinent Labs/Diagnostic Test Results:   6/27 MRI arthrogram L hip - pending     6/27 CT L hip- No acute osseous abnormality  Results from last 7 days   Lab Units 06/28/19  0447 06/27/19  1158   WBC Thousand/uL 7 76 9 00   HEMOGLOBIN g/dL 16 0 17 3*   HEMATOCRIT % 50 3* 53 3*   PLATELETS Thousands/uL 275 348   NEUTROS ABS Thousands/µL 3 49 4 92     Results from last 7 days   Lab Units 06/28/19  0447 06/27/19  1158   SODIUM mmol/L 142 139   POTASSIUM mmol/L 4 3 4 5   CHLORIDE mmol/L 108 107   CO2 mmol/L 28 25   ANION GAP mmol/L 6 7   BUN mg/dL 33* 25   CREATININE mg/dL 0 89 1 05   EGFR ml/min/1 73sq m 90 75   CALCIUM mg/dL 9 1 10 4*     Results from last 7 days   Lab Units 06/28/19  0447 06/27/19  1158   GLUCOSE RANDOM mg/dL 103 169*     Results from last 7 days   Lab Units 06/27/19  1158   CRP mg/L 6 6*   SED RATE mm/hour 7     ED Treatment:   Medication Administration from 06/27/2019 1041 to 06/27/2019 1655       Date/Time Order Dose Route Action     06/27/2019 1155 fentanyl citrate (PF) 100 MCG/2ML 50 mcg 50 mcg Intravenous Given     06/27/2019 1328 fentanyl citrate (PF) 100 MCG/2ML 50 mcg 50 mcg Intravenous Given     06/27/2019 1449 ketorolac (TORADOL) injection 15 mg 15 mg Intravenous Given     06/27/2019 1626 HYDROmorphone (DILAUDID) injection 0 5 mg 0 5 mg Intravenous Given        Past Medical History:   Diagnosis Date    COPD (chronic obstructive pulmonary disease) ([address] )      Present on Admission:   Leg pain   Trochanteric bursitis   Tobacco abuse      Admitting Diagnosis: Leg pain [M79 606]  Left leg pain [M79 605]  Ambulatory dysfunction [R26 2]  Age/Sex: 58 y o  male  Admission Orders:    Current Facility-Administered Medications:  acetaminophen 975 mg Oral Q8H [facility]    diclofenac sodium 2 g Topical 4x Daily    DULoxetine 60 mg Oral Daily    enoxaparin 40 mg Subcutaneous Daily    gabapentin 600 mg Oral TID    HYDROmorphone 0 2 mg Intravenous Q4H PRN    ketorolac 30 mg Intravenous Q6H PRN    lidocaine 1 patch Topical Daily    methocarbamol 500 mg Oral Q6H PRN x1   nicotine 1 patch Transdermal Daily    oxyCODONE 5 mg Oral Q4H PRN      Reg diet   PT OT eval   Up and OOB as juan   Ortho consult     Network Utilization Review Department  Phone: [phone]; Fax [phone]  [email or web address]  ATTENTION: Please call with any questions or concerns to [phone]  and carefully listen to the prompts so that you are directed to the right person  Send all requests for admission clinical reviews, approved or denied determinations and any other requests to fax [phone]   All voicemails are confidential

## 2019-06-28 NOTE — SOCIAL WORK
CM met with pt to discuss CM role in D/C planning  Pt reported the following:    Emergency Contact: Tr Malone [phone]  POA/LW: BrotherTr  Level of assist with ADL's PTA: IND  House or Apt: 1st Floor Apt  ALONSO to enter: [address] ALONSO with [address] on [address]st floor: Yes  DME: Cane  VNA: None  SNF/Rehab: Rehab in [address]: Drives self  Help at home: Lives alone    Pharmacy/Rx Coverage: [facility] [address]  Name of PCP: Dr Deangelo Solomon tx for [address]: None  Inpt tx for SA: Deanne Mathis in [place] 22 years ago; pt stated that he's been sober for years and declined HOST  Employment/Income: Disability    Anticipated D/C Plan: Pt anticipates return to his apt  CM discussed OP OBS Notice with pt and gave pt a copy  Placed signed copy in medical records bin  CM reviewed d/c planning process including the following: identifying help at home, patient preference for d/c planning needs, Discharge Lounge, [facility] Meds to Bed program, availability of treatment team to discuss questions or concerns patient and/or family may have regarding understanding medications and recognizing signs and symptoms once discharged  CM also encouraged patient to follow up with all recommended appointments after discharge  Patient advised of importance for patient and family to participate in managing patients medical well being

## 2019-06-28 NOTE — PLAN OF CARE
Problem: PHYSICAL THERAPY ADULT  Goal: Performs mobility at highest level of function for planned discharge setting  See evaluation for individualized goals  Description  Treatment/Interventions: Functional transfer training, LE strengthening/ROM, Elevations, Therapeutic exercise, Endurance training, Patient/family training, Equipment eval/education, Gait training, Spoke to nursing  Equipment Recommended: Harvey Phoenix, Cane(pt owns cane, will need walker pending progress)       See flowsheet documentation for full assessment, interventions and recommendations  Note:   Prognosis: Good  Problem List: Decreased strength, Decreased endurance, Impaired balance, Decreased mobility, Pain  Assessment: Pt is a 58 y o  Male admitted to [facility] for worsening L hip and leg pain that began after a Cortizone shot to the L hip  Pt with a history of LBP extending into groin and LLE  Pt with other significant PMH including COPD and meningioma resection  He was seen today for a PT evaluation, found supine in bed awake and agreeable to therapy  Pt reporting 4/10 pain at rest, reporting 7/10 pain prior to meds given by nurse  With standing pt reporting 10/10 pain in leg making him unable to ambulate longer than bed to chair  His pain extends from the groin down the anterior leg to the knee, pain is not extending below the knee  Pt is able to complete bed mobility and transfers with RW with supervision, he is able to ambulate short distance bed to chair before requiring seated break 2* to pain, he reports that at home prior to coming to hospital his LLE was buckling causing him to nearly fall 2 times although no instances of buckling during gait trial  During gait pt using step to pattern with poor weight bearing through LLE  He is unable to ambulate household distances and unable to attempt stair training 2* to increased pain  Barthel index is currently 65/100   PT recommendation is to d/c to inpt rehab vs home pending improved pain management allowing progress with gait and stair training  PT equipment recommendation is to utilize RW at this time for transfers and ambulation, will progress to less restrictive device pending pt ability to increase weight bearing through LLE  Pt is OK to d/c to inpt rehab when medically cleared, NOT OK to d/c home unless able to show progress with ambulation and stair negotiation  Barriers to Discharge: Inaccessible home environment, Decreased caregiver support  Barriers to Discharge Comments: lives alone with 4 ALONSO  Recommendation: (inpt rehab vs home pending improved pain, ambulation, stairs)     PT - OK to Discharge: Yes(to rehab when medically cleared)    See flowsheet documentation for full assessment

## 2019-07-01 ENCOUNTER — TELEPHONE (OUTPATIENT)
Dept: NEUROSURGERY | Facility: CLINIC | Age: 64
End: 2019-07-01

## 2019-07-01 ENCOUNTER — DOCUMENTATION (OUTPATIENT)
Dept: NEUROSURGERY | Facility: CLINIC | Age: 64
End: 2019-07-01

## 2019-07-01 DIAGNOSIS — M79.605 PAIN OF LEFT LOWER EXTREMITY: Primary | ICD-10-CM

## 2019-07-01 DIAGNOSIS — G95.19 NEUROGENIC CLAUDICATION (HCC): ICD-10-CM

## 2019-07-01 NOTE — TELEPHONE ENCOUNTER
Scheduled patient for the following appts:    7/6/19 1pm ([facility])    Mri L-Spine    7/12/19 10:45 ([facility] with Aline Torres and Dr Lory Villarreal patient back to inform him of this and he was appreciative

## 2019-07-01 NOTE — TELEPHONE ENCOUNTER
Patient called this morning stating that he saw Dr Maria M Gray back on 6/10/19  He saw an ortho specialist regarding his hip like Dr Maria M Gray had recommended  Patient received hip injection and after he was in an immense amount of pain and went to the ER  Patient said that he was told by Dr Gweneth Cheadle and the Dr  In the ER to follow up with our office  Patient symptom is pain down his left leg which starts at the back of the hip and goes into the thigh and down to the knee  Worse when he is up and moving around  Spoke with Dr Maria M Gray regarding symptoms  He said we may bring patient in with himself and Lima Liu as a SNPX after patient completes L-spine MRI  Will schedule MRI for patient and then follow up

## 2019-07-04 ENCOUNTER — HOSPITAL ENCOUNTER (EMERGENCY)
Facility: HOSPITAL | Age: 64
Discharge: HOME | End: 2019-07-04
Attending: EMERGENCY MEDICINE
Payer: MEDICARE

## 2019-07-04 VITALS
TEMPERATURE: 99.5 F | SYSTOLIC BLOOD PRESSURE: 143 MMHG | OXYGEN SATURATION: 96 % | RESPIRATION RATE: 18 BRPM | DIASTOLIC BLOOD PRESSURE: 67 MMHG | HEART RATE: 106 BPM

## 2019-07-04 DIAGNOSIS — M79.605 PAIN OF LEFT LOWER EXTREMITY: Primary | ICD-10-CM

## 2019-07-04 PROCEDURE — 99283 EMERGENCY DEPT VISIT LOW MDM: CPT | Performed by: EMERGENCY MEDICINE

## 2019-07-04 PROCEDURE — 99283 EMERGENCY DEPT VISIT LOW MDM: CPT

## 2019-07-04 RX ORDER — OXYCODONE HYDROCHLORIDE 5 MG/1
5 TABLET ORAL EVERY 4 HOURS PRN
Qty: 5 TABLET | Refills: 0 | Status: SHIPPED | OUTPATIENT
Start: 2019-07-04 | End: 2019-07-09

## 2019-07-04 RX ORDER — LIDOCAINE 50 MG/G
1 PATCH TOPICAL ONCE
Status: DISCONTINUED | OUTPATIENT
Start: 2019-07-04 | End: 2019-07-04 | Stop reason: HOSPADM

## 2019-07-04 RX ADMIN — LIDOCAINE 1 PATCH: 50 PATCH TOPICAL at 11:33

## 2019-07-04 NOTE — ED PROVIDER NOTES
History  Chief Complaint   Patient presents with    Back Pain     Patient has a hx of chronic back pain, reports he has had MRIs in the past and was told he needs fusion and has herniated discs  Patient reports the providers he has seen for his back have not prescribed him pain medication  Denies bowel/bladder issues  Reports L leg numbness X1 month  History provided by:  Patient   used: No    Back Pain   Location:  Lumbar spine  Quality:  Aching  Radiates to:  L posterior upper leg  Pain severity:  Moderate  Pain is:  Same all the time  Onset quality:  Gradual  Timing:  Intermittent  Progression:  Waxing and waning  Chronicity:  Chronic  Context: not falling and not recent injury    Relieved by:  Nothing  Worsened by:  Nothing  Ineffective treatments:  None tried  Associated symptoms: leg pain, numbness and weakness    Associated symptoms: no abdominal pain, no bladder incontinence, no bowel incontinence, no chest pain, no dysuria, no fever and no headaches    Weakness:     Severity:  Mild    Duration: months  Onset quality:  Gradual    Chronicity:  Chronic    Timing:  Constant    Progression:  Unchanged  Risk factors: no recent surgery        Prior to Admission Medications   Prescriptions Last Dose Informant Patient Reported? Taking? DULoxetine (CYMBALTA) 60 mg delayed release capsule   Yes No   Sig: Daily   albuterol (VENTOLIN HFA) 90 mcg/act inhaler   Yes No   Sig: ventolin hfa 108 (90 base) mcg/actaers   diclofenac (VOLTAREN) 75 mg EC tablet   No No   Sig: Take 1 tablet (75 mg total) by mouth 2 (two) times a day as needed (pain) Take with food     gabapentin (NEURONTIN) 600 MG tablet   Yes No   Si capsules 3 (three) times a day   ketorolac (TORADOL) 10 mg tablet   No No   Sig: Take 1 tablet (10 mg total) by mouth every 6 (six) hours as needed for moderate pain   lidocaine (LIDODERM) 5 %   No No   Sig: Apply 1 patch topically daily Remove & Discard patch within 12 hours or as directed by MD   oxyCODONE (ROXICODONE) 5 mg immediate release tablet   No No   Sig: Take 1 tablet (5 mg total) by mouth daily as needed for severe pain for up to 7 daysMax Daily Amount: 5 mg      Facility-Administered Medications: None       Past Medical History:   Diagnosis Date    Chronic back pain     [facility] obstructive [facility] disease) [facility] )     Lumbar herniated disc        Past Surgical History:   Procedure Laterality Date    BACK SURGERY      NECK SURGERY      SHOULDER SURGERY      SPINE SURGERY         History reviewed  No pertinent family history  I have reviewed and agree with the history as documented  Social History     Tobacco Use    Smoking status: Current Every Day Smoker     Packs/day: 0 50     Years: 30 00     Pack years: 15 00    Smokeless tobacco: Never Used   Substance Use Topics    Alcohol use: Not Currently    Drug use: Never        Review of Systems   Constitutional: Negative for chills and fever  HENT: Negative for facial swelling, sore throat and trouble swallowing  Eyes: Negative for pain and visual disturbance  Respiratory: Negative for cough and shortness of breath  Cardiovascular: Negative for chest pain and leg swelling  Gastrointestinal: Negative for abdominal pain, blood in stool, bowel incontinence, diarrhea, nausea and vomiting  Genitourinary: Negative for bladder incontinence, dysuria and flank pain  Musculoskeletal: Positive for back pain  Negative for neck pain and neck stiffness  Skin: Negative for pallor and rash  Allergic/Immunologic: Negative for environmental allergies and immunocompromised state  Neurological: Positive for weakness and numbness  Negative for dizziness and headaches  Hematological: Negative for adenopathy  Does not bruise/bleed easily  Psychiatric/Behavioral: Negative for agitation and behavioral problems  All other systems reviewed and are negative        Physical Exam  Physical Exam   Constitutional: He is oriented to person, place, and time  He appears well-developed and well-nourished  No distress  HENT:   Head: Normocephalic and atraumatic  Eyes: EOM are normal    Neck: Normal range of motion  Neck supple  Cardiovascular: Normal rate, regular rhythm, normal heart sounds and intact distal pulses  Pulmonary/Chest: Effort normal and breath sounds normal    Abdominal: Soft  Bowel sounds are normal  There is no tenderness  There is no rebound and no guarding  Musculoskeletal: Normal range of motion  Old midline lumbar spine scar, no deformity, tenderness, decreased ROM at left hip at baseline with Grade 4/5 baseline weakness at left hip, distal NV intact, sensory exam normal   Neurological: He is alert and oriented to person, place, and time  Skin: Skin is warm and dry  Psychiatric: He has a normal mood and affect  Nursing note and vitals reviewed        Vital Signs  ED Triage Vitals   Temperature Pulse Respirations Blood Pressure SpO2   07/04/19 1034 07/04/19 1032 07/04/19 1032 07/04/19 1032 07/04/19 1032   99 5 °F (37 5 °C) (!) 106 18 143/67 96 %      Temp Source Heart Rate Source Patient Position - Orthostatic VS BP Location FiO2 (%)   07/04/19 1034 07/04/19 1032 07/04/19 1032 07/04/19 1032 --   Oral Monitor Sitting Left arm       Pain Score       --                  Vitals:    07/04/19 1032   BP: 143/67   Pulse: (!) 106   Patient Position - Orthostatic VS: Sitting         Visual Acuity      ED Medications  Medications - No data to display    Diagnostic Studies  Results Reviewed     None                 No orders to display              Procedures  Procedures       ED Course                               MDM  Number of Diagnoses or Management Options  Pain of left lower extremity:   Diagnosis management comments: Patient is a 16-year-old male, history of chronic back pain, lumbar spine surgery, left hip osteoarthritis, recent fall by Neurosurgery, scheduled to get MRI lumbar spine coming Tuesday; ran out of pain meds, states that he was given few pills of oxycodone after last admission/discharge; no bowel or bladder incontinence, patient has subjective numbness off and on his left leg which is going on some time  On exam patient is stable, no distress, no acute abnormality on lumbar and lower extremity exam noted, does have baseline decreased range of movement of left hip and mild weakness of the left leg, neurovascular intact distally  Impression:  Worsening chronic lumbar spine/left hip pain, records checked, imaging studies reviewed, we will cover patient for 2 days pain meds, advised to follow up with PCP and pain medicine  Amount and/or Complexity of Data Reviewed  Review and summarize past medical records: yes        Disposition  Final diagnoses:   Pain of left lower extremity     Time reflects when diagnosis was documented in both MDM as applicable and the Disposition within this note     Time User Action Codes Description Comment    7/4/2019 11:24 AM Nasima, [facility] [address] Pain of left lower extremity     7/4/2019 11:25 AM Avinash Gonzalez Modify [M79 605] Pain of left lower extremity       ED Disposition     ED Disposition Condition Date/Time Comment    Discharge Stable u Jul 4, 2019 11:21 AM Gudelia Deluca discharge to home/self care              Follow-up Information     Follow up With Specialties Details Why Contact Info Additional Information    García Villanueva MD Family Medicine Schedule an appointment as soon as possible for a visit in 1 day  [address] [facility] Pain Medicine Schedule an appointment as soon as possible for a visit in 1 day  [place] [id number]  [address], [address],  [address] [place], [place], [address]          Discharge Medication List as of 7/4/2019 11:28 AM      START taking these medications    Details   !! oxyCODONE (ROXICODONE) 5 mg immediate release tablet Take 1 tablet (5 mg total) by mouth every 4 (four) hours as needed for moderate pain for up to 5 daysMax Daily Amount: 30 mg, Starting Thu 7/4/2019, Until Tue 7/9/2019, Print       !! - Potential duplicate medications found  Please discuss with provider  CONTINUE these medications which have NOT CHANGED    Details   albuterol (VENTOLIN HFA) 90 mcg/act inhaler ventolin hfa 108 (90 base) mcg/actaers, Historical Med      diclofenac (VOLTAREN) 75 mg EC tablet Take 1 tablet (75 mg total) by mouth 2 (two) times a day as needed (pain) Take with food , Starting Fri 6/21/2019, Normal      DULoxetine (CYMBALTA) 60 mg delayed release capsule Daily, Historical Med      gabapentin (NEURONTIN) 600 MG tablet 2 capsules 3 (three) times a day, Historical Med      ketorolac (TORADOL) 10 mg tablet Take 1 tablet (10 mg total) by mouth every 6 (six) hours as needed for moderate pain, Starting Fri 6/28/2019, Print      lidocaine (LIDODERM) 5 % Apply 1 patch topically daily Remove & Discard patch within 12 hours or as directed by MD, Starting Fri 6/28/2019, Print      !! oxyCODONE (ROXICODONE) 5 mg immediate release tablet Take 1 tablet (5 mg total) by mouth daily as needed for severe pain for up to 7 daysMax Daily Amount: 5 mg, Starting Fri 6/28/2019, Until Fri 7/5/2019, Print       !! - Potential duplicate medications found  Please discuss with provider  No discharge procedures on file      ED Provider  Electronically Signed by           Cristina Underwood MD  07/04/19 8690

## 2019-07-06 ENCOUNTER — HOSPITAL ENCOUNTER (OUTPATIENT)
Dept: MRI IMAGING | Facility: HOSPITAL | Age: 64
Discharge: HOME | End: 2019-07-06
Attending: NEUROLOGICAL SURGERY
Payer: MEDICARE

## 2019-07-06 DIAGNOSIS — G95.19 NEUROGENIC CLAUDICATION (HCC): ICD-10-CM

## 2019-07-06 DIAGNOSIS — M79.605 PAIN OF LEFT LOWER EXTREMITY: ICD-10-CM

## 2019-07-06 PROCEDURE — 72148 MRI LUMBAR SPINE W/O DYE: CPT

## 2019-07-11 ENCOUNTER — HOSPITAL ENCOUNTER (INPATIENT)
Facility: HOSPITAL | Age: 64
LOS: 3 days | Discharge: HOME | DRG: 519 | End: 2019-07-14
Attending: EMERGENCY MEDICINE | Admitting: INTERNAL MEDICINE
Payer: MEDICARE

## 2019-07-11 DIAGNOSIS — M54.42 ACUTE MIDLINE LOW BACK PAIN WITH LEFT-SIDED SCIATICA: ICD-10-CM

## 2019-07-11 DIAGNOSIS — G89.29 CHRONIC LEFT-SIDED LOW BACK PAIN WITHOUT SCIATICA: ICD-10-CM

## 2019-07-11 DIAGNOSIS — M54.10 RADICULOPATHY: ICD-10-CM

## 2019-07-11 DIAGNOSIS — M48.061 SPINAL STENOSIS OF LUMBAR REGION, UNSPECIFIED WHETHER NEUROGENIC CLAUDICATION PRESENT: Primary | ICD-10-CM

## 2019-07-11 DIAGNOSIS — M54.50 CHRONIC LEFT-SIDED LOW BACK PAIN WITHOUT SCIATICA: ICD-10-CM

## 2019-07-11 PROBLEM — J44.9 COPD (CHRONIC OBSTRUCTIVE PULMONARY DISEASE) (HCC): Status: ACTIVE | Noted: 2019-07-11

## 2019-07-11 PROBLEM — F11.20 OPIOID DEPENDENCE (HCC): Status: ACTIVE | Noted: 2019-07-11

## 2019-07-11 PROBLEM — M48.00 SPINAL STENOSIS: Status: ACTIVE | Noted: 2019-07-11

## 2019-07-11 LAB
ABO GROUP BLD: NORMAL
APTT PPP: 28 SECONDS (ref 23–37)
BILIRUB UR QL STRIP: ABNORMAL
BLD GP AB SCN SERPL QL: NEGATIVE
CLARITY UR: CLEAR
COLOR UR: ABNORMAL
GLUCOSE UR STRIP-MCNC: NEGATIVE MG/DL
HGB UR QL STRIP.AUTO: NEGATIVE
INR PPP: 1.19 (ref 0.84–1.19)
KETONES UR STRIP-MCNC: ABNORMAL MG/DL
LEUKOCYTE ESTERASE UR QL STRIP: NEGATIVE
NITRITE UR QL STRIP: NEGATIVE
PH UR STRIP.AUTO: 6 [PH]
PROT UR STRIP-MCNC: NEGATIVE MG/DL
PROTHROMBIN TIME: 14.7 SECONDS (ref 11.6–14.5)
RH BLD: POSITIVE
SP GR UR STRIP.AUTO: 1.02 (ref 1–1.03)
SPECIMEN EXPIRATION DATE: NORMAL
UROBILINOGEN UR QL STRIP.AUTO: 1 E.U./DL

## 2019-07-11 PROCEDURE — 86900 BLOOD TYPING SEROLOGIC ABO: CPT | Performed by: PHYSICIAN ASSISTANT

## 2019-07-11 PROCEDURE — NC001 PR NO CHARGE: Performed by: NEUROLOGICAL SURGERY

## 2019-07-11 PROCEDURE — 86850 RBC ANTIBODY SCREEN: CPT | Performed by: PHYSICIAN ASSISTANT

## 2019-07-11 PROCEDURE — 99284 EMERGENCY DEPT VISIT MOD MDM: CPT

## 2019-07-11 PROCEDURE — 85730 THROMBOPLASTIN TIME PARTIAL: CPT | Performed by: PHYSICIAN ASSISTANT

## 2019-07-11 PROCEDURE — 96375 TX/PRO/DX INJ NEW DRUG ADDON: CPT

## 2019-07-11 PROCEDURE — 99232 SBSQ HOSP IP/OBS MODERATE 35: CPT | Performed by: INTERNAL MEDICINE

## 2019-07-11 PROCEDURE — 81003 URINALYSIS AUTO W/O SCOPE: CPT | Performed by: STUDENT IN AN ORGANIZED HEALTH CARE EDUCATION/TRAINING PROGRAM

## 2019-07-11 PROCEDURE — 99223 1ST HOSP IP/OBS HIGH 75: CPT | Performed by: NEUROLOGICAL SURGERY

## 2019-07-11 PROCEDURE — 99285 EMERGENCY DEPT VISIT HI MDM: CPT | Performed by: EMERGENCY MEDICINE

## 2019-07-11 PROCEDURE — 36415 COLL VENOUS BLD VENIPUNCTURE: CPT | Performed by: PHYSICIAN ASSISTANT

## 2019-07-11 PROCEDURE — 96374 THER/PROPH/DIAG INJ IV PUSH: CPT

## 2019-07-11 PROCEDURE — 85610 PROTHROMBIN TIME: CPT | Performed by: PHYSICIAN ASSISTANT

## 2019-07-11 PROCEDURE — 86901 BLOOD TYPING SEROLOGIC RH(D): CPT | Performed by: PHYSICIAN ASSISTANT

## 2019-07-11 RX ORDER — ALBUTEROL SULFATE 90 UG/1
1 AEROSOL, METERED RESPIRATORY (INHALATION) EVERY 4 HOURS PRN
Status: DISCONTINUED | OUTPATIENT
Start: 2019-07-11 | End: 2019-07-14 | Stop reason: HOSPADM

## 2019-07-11 RX ORDER — HYDROMORPHONE HCL/PF 1 MG/ML
1 SYRINGE (ML) INJECTION ONCE
Status: COMPLETED | OUTPATIENT
Start: 2019-07-11 | End: 2019-07-11

## 2019-07-11 RX ORDER — VANCOMYCIN HYDROCHLORIDE 1 G/200ML
1000 INJECTION, SOLUTION INTRAVENOUS ONCE
Status: DISCONTINUED | OUTPATIENT
Start: 2019-07-12 | End: 2019-07-12 | Stop reason: HOSPADM

## 2019-07-11 RX ORDER — ACETAMINOPHEN 325 MG/1
650 TABLET ORAL EVERY 6 HOURS PRN
Status: DISCONTINUED | OUTPATIENT
Start: 2019-07-11 | End: 2019-07-12

## 2019-07-11 RX ORDER — DULOXETIN HYDROCHLORIDE 60 MG/1
60 CAPSULE, DELAYED RELEASE ORAL DAILY
Status: DISCONTINUED | OUTPATIENT
Start: 2019-07-12 | End: 2019-07-14 | Stop reason: HOSPADM

## 2019-07-11 RX ORDER — CHLORHEXIDINE GLUCONATE 0.12 MG/ML
15 RINSE ORAL ONCE
Status: DISCONTINUED | OUTPATIENT
Start: 2019-07-11 | End: 2019-07-11

## 2019-07-11 RX ORDER — CHLORHEXIDINE GLUCONATE 0.12 MG/ML
15 RINSE ORAL ONCE
Status: COMPLETED | OUTPATIENT
Start: 2019-07-11 | End: 2019-07-11

## 2019-07-11 RX ORDER — AMOXICILLIN 250 MG
1 CAPSULE ORAL
Status: DISCONTINUED | OUTPATIENT
Start: 2019-07-11 | End: 2019-07-14 | Stop reason: HOSPADM

## 2019-07-11 RX ORDER — OXYCODONE HYDROCHLORIDE 5 MG/1
5 TABLET ORAL EVERY 6 HOURS PRN
Status: DISCONTINUED | OUTPATIENT
Start: 2019-07-11 | End: 2019-07-12

## 2019-07-11 RX ORDER — OXYCODONE HYDROCHLORIDE 5 MG/1
5 TABLET ORAL EVERY 6 HOURS PRN
Status: DISCONTINUED | OUTPATIENT
Start: 2019-07-11 | End: 2019-07-11

## 2019-07-11 RX ORDER — NICOTINE 21 MG/24HR
1 PATCH, TRANSDERMAL 24 HOURS TRANSDERMAL DAILY
Status: DISCONTINUED | OUTPATIENT
Start: 2019-07-12 | End: 2019-07-14 | Stop reason: HOSPADM

## 2019-07-11 RX ORDER — HYDROMORPHONE HCL/PF 1 MG/ML
0.5 SYRINGE (ML) INJECTION EVERY 6 HOURS PRN
Status: DISCONTINUED | OUTPATIENT
Start: 2019-07-11 | End: 2019-07-11

## 2019-07-11 RX ORDER — HYDROMORPHONE HCL/PF 1 MG/ML
0.5 SYRINGE (ML) INJECTION EVERY 6 HOURS PRN
Status: DISCONTINUED | OUTPATIENT
Start: 2019-07-11 | End: 2019-07-12

## 2019-07-11 RX ORDER — ONDANSETRON 2 MG/ML
4 INJECTION INTRAMUSCULAR; INTRAVENOUS ONCE
Status: COMPLETED | OUTPATIENT
Start: 2019-07-11 | End: 2019-07-11

## 2019-07-11 RX ORDER — SODIUM CHLORIDE 9 MG/ML
125 INJECTION, SOLUTION INTRAVENOUS CONTINUOUS
Status: DISCONTINUED | OUTPATIENT
Start: 2019-07-11 | End: 2019-07-13

## 2019-07-11 RX ORDER — OXYCODONE HYDROCHLORIDE 10 MG/1
10 TABLET ORAL EVERY 6 HOURS PRN
Status: DISCONTINUED | OUTPATIENT
Start: 2019-07-11 | End: 2019-07-12

## 2019-07-11 RX ADMIN — HYDROMORPHONE HYDROCHLORIDE 1 MG: 1 INJECTION, SOLUTION INTRAMUSCULAR; INTRAVENOUS; SUBCUTANEOUS at 15:07

## 2019-07-11 RX ADMIN — DESMOPRESSIN ACETATE 24.8 MCG: 4 SOLUTION INTRAVENOUS at 17:29

## 2019-07-11 RX ADMIN — OXYCODONE HYDROCHLORIDE 10 MG: 10 TABLET ORAL at 20:19

## 2019-07-11 RX ADMIN — HYDROMORPHONE HYDROCHLORIDE 1 MG: 1 INJECTION, SOLUTION INTRAMUSCULAR; INTRAVENOUS; SUBCUTANEOUS at 19:08

## 2019-07-11 RX ADMIN — CHLORHEXIDINE GLUCONATE 0.12% ORAL RINSE 15 ML: 1.2 LIQUID ORAL at 19:09

## 2019-07-11 RX ADMIN — HYDROMORPHONE HYDROCHLORIDE 0.5 MG: 1 INJECTION, SOLUTION INTRAMUSCULAR; INTRAVENOUS; SUBCUTANEOUS at 23:22

## 2019-07-11 RX ADMIN — SODIUM CHLORIDE 125 ML/HR: 0.9 INJECTION, SOLUTION INTRAVENOUS at 19:09

## 2019-07-11 RX ADMIN — ONDANSETRON 4 MG: 2 INJECTION INTRAMUSCULAR; INTRAVENOUS at 15:07

## 2019-07-11 NOTE — ED PROVIDER NOTES
History  Chief Complaint   Patient presents with    Back Pain     Pt c/o left sided low back pain and left leg pain  Pt states was admitted 2 weeks ago for same  Pt states pain is so bad he can not take it  69-year-old male presents to ED with chronic worsening the lumbar left paraspinal pain as well as left leg radicular symptoms  MRI taken 5 days ago shows severe stenosis with disc protrusion, has follow-up appoint with Dr Kaushal Tiwari of Neurosurgery tomorrow  Patient states he has been unable to sleep last 3 days and his pain is intolerable and that he lives by himself and is having difficulties walking at home due to the pain so he came to the ER  Denies any loss of bowel or bladder control, urinary retention, saddle anesthesia, recent illness, fever, night sweats, chills, headache, dizziness, lightheaded with sore throat, cough, chest pain, shortness of breath, abdominal pain, nausea/vomiting, diarrhea/constipation, dysuria, hematuria  Prior to Admission Medications   Prescriptions Last Dose Informant Patient Reported? Taking? DULoxetine (CYMBALTA) 60 mg delayed release capsule   Yes No   Sig: Daily   albuterol (VENTOLIN HFA) 90 mcg/act inhaler   Yes No   Sig: ventolin hfa 108 (90 base) mcg/actaers   diclofenac (VOLTAREN) 75 mg EC tablet   No No   Sig: Take 1 tablet (75 mg total) by mouth 2 (two) times a day as needed (pain) Take with food     gabapentin (NEURONTIN) 600 MG tablet   Yes No   Si capsules 3 (three) times a day   ketorolac (TORADOL) 10 mg tablet   No No   Sig: Take 1 tablet (10 mg total) by mouth every 6 (six) hours as needed for moderate pain   lidocaine (LIDODERM) 5 %   No No   Sig: Apply 1 patch topically daily Remove & Discard patch within 12 hours or as directed by MD      Facility-Administered Medications: None       Past Medical History:   Diagnosis Date    Chronic back pain     COPD (chronic obstructive pulmonary disease) (HCC)     Lumbar herniated disc        Past Surgical History:   Procedure Laterality Date    BACK SURGERY      NECK SURGERY      SHOULDER SURGERY      SPINE SURGERY         History reviewed  No pertinent family history  I have reviewed and agree with the history as documented  Social History     Tobacco Use    Smoking status: Current Every Day Smoker     Packs/day: 0 50     Years: 30 00     Pack years: 15 00    Smokeless tobacco: Never Used   Substance Use Topics    Alcohol use: Not Currently    Drug use: Never        Review of Systems   Constitutional: Negative for activity change, appetite change, chills, diaphoresis, fatigue and fever  HENT: Negative for congestion, postnasal drip, rhinorrhea, sinus pressure, sinus pain, sneezing and sore throat  Eyes: Negative for redness and visual disturbance  Respiratory: Negative for apnea, cough, chest tightness, shortness of breath, wheezing and stridor  Cardiovascular: Negative for chest pain, palpitations and leg swelling  Gastrointestinal: Negative for abdominal distention, abdominal pain, constipation, diarrhea, nausea and vomiting  Endocrine: Negative for polydipsia, polyphagia and polyuria  Genitourinary: Negative for difficulty urinating, dysuria, frequency and urgency  Musculoskeletal: Positive for back pain, gait problem and myalgias  Negative for arthralgias, joint swelling, neck pain and neck stiffness  Skin: Negative for color change, pallor, rash and wound  Neurological: Positive for weakness and numbness  Negative for dizziness, tremors, seizures, syncope, facial asymmetry, speech difficulty, light-headedness and headaches         Physical Exam  ED Triage Vitals [07/11/19 1243]   Temp Pulse Respirations Blood Pressure SpO2   -- 104 18 140/89 97 %      Temp src Heart Rate Source Patient Position - Orthostatic VS BP Location FiO2 (%)   -- Monitor Sitting Left arm --      Pain Score       8             Orthostatic Vital Signs  Vitals:    07/11/19 1243 07/11/19 1450 07/11/19 1619   BP: 140/89 133/78 122/68   Pulse: 104 98 78   Patient Position - Orthostatic VS: Sitting Lying Lying       Physical Exam   Constitutional: He is oriented to person, place, and time  He appears well-developed and well-nourished  No distress  HENT:   Head: Normocephalic and atraumatic  Right Ear: External ear normal    Left Ear: External ear normal    Nose: Nose normal    Eyes: Conjunctivae are normal  Right eye exhibits no discharge  Left eye exhibits no discharge  No scleral icterus  Neck: Normal range of motion  Neck supple  Cardiovascular: Normal rate, regular rhythm, normal heart sounds and intact distal pulses  Exam reveals no gallop and no friction rub  No murmur heard  Pulmonary/Chest: Effort normal and breath sounds normal  No respiratory distress  He has no wheezes  He has no rales  Abdominal: Soft  Bowel sounds are normal  He exhibits no distension and no mass  There is no tenderness  There is no guarding  Musculoskeletal: Normal range of motion  He exhibits no edema, tenderness or deformity  Neurological: He is alert and oriented to person, place, and time  He is not disoriented  A sensory deficit is present  Gait abnormal  GCS eye subscore is 4  GCS verbal subscore is 5  GCS motor subscore is 6    5/5 strength x 3 extremities  3+/5 LLE  Mildly diminished sensation LLE   CN intact  No Dysmetria or Dysdiadochokinesia  GCS 15  No pronator drift       Skin: Skin is warm and dry  No rash noted  He is not diaphoretic  No erythema  No pallor  Psychiatric: He has a normal mood and affect  His behavior is normal  Judgment and thought content normal    Nursing note and vitals reviewed        ED Medications  Medications   chlorhexidine (PERIDEX) 0 12 % oral rinse 15 mL (has no administration in time range)   sodium chloride 0 9 % infusion (has no administration in time range)   vancomycin (VANCOCIN) IVPB (premix) 1,000 mg (has no administration in time range)   albuterol (PROVENTIL HFA,VENTOLIN HFA) inhaler 1 puff (has no administration in time range)   DULoxetine (CYMBALTA) delayed release capsule 60 mg (has no administration in time range)   nicotine (NICODERM CQ) 14 mg/24hr TD 24 hr patch 1 patch (has no administration in time range)   acetaminophen (TYLENOL) tablet 650 mg (has no administration in time range)   senna-docusate sodium (SENOKOT S) 8 6-50 mg per tablet 1 tablet (has no administration in time range)   HYDROmorphone (DILAUDID) injection 1 mg (has no administration in time range)   oxyCODONE (ROXICODONE) IR tablet 5 mg (has no administration in time range)   oxyCODONE (ROXICODONE) immediate release tablet 10 mg (has no administration in time range)   HYDROmorphone (DILAUDID) injection 0 5 mg (has no administration in time range)   HYDROmorphone (DILAUDID) injection 1 mg (1 mg Intravenous Given 7/11/19 1507)   ondansetron (ZOFRAN) injection 4 mg (4 mg Intravenous Given 7/11/19 1507)   desmopressin (DDAVP) 24 8 mcg in sodium chloride 0 9 % 50 mL IVPB (0 mcg/kg × 83 kg Intravenous Stopped 7/11/19 1759)       Diagnostic Studies  Results Reviewed     Procedure Component Value Units Date/Time    APTT [[id number]]  (Normal) Collected:  07/11/19 1702    Lab Status:  Final result Specimen:  Blood from Arm, Right Updated:  07/11/19 1739     PTT 28 seconds     Protime-INR [[id number]]  (Abnormal) Collected:  07/11/19 1702    Lab Status:  Final result Specimen:  Blood from Arm, Right Updated:  07/11/19 1739     Protime 14 7 seconds      INR 1 19    Urinalysis with reflex to microscopic [[id number]]     Lab Status:  No result Specimen:  Urine                  No orders to display         Procedures  Procedures        ED Course  ED Course as of Jul 11 1840   Thu Jul 11, 2019   [address] [place] [address]    Disposition  Final diagnoses:   Radiculopathy   Acute midline low back pain with left-sided sciatica     Time reflects when diagnosis was documented in both MDM as applicable and the Disposition within this note     Time User Action Codes Description Comment    7/11/2019  4:40 PM Amrik [address] Spinal stenosis of lumbar region, unspecified whether neurogenic claudication present     7/11/2019  4:41 PM Edith Olivo Add [M54 5,  G89 29] Chronic left-sided low back pain without sciatica     7/11/2019  5:16 PM Rosalie Del Castillo Add [C24 18] Radiculopathy     7/11/2019  6:40 PM Lacy Jackson Add [M54 42] Acute midline low back pain with left-sided sciatica       ED Disposition     ED Disposition Condition Date/Time Comment    Admit Stable Thu Jul 11, 2019  5:16 PM Case was discussed with SOD and the patient's admission status was agreed to be Admission Status: inpatient status to the service of Dr Ruth Ann Calvin   Follow-up Information    None         Patient's Medications   Discharge Prescriptions    No medications on file     No discharge procedures on file  ED Provider  Attending physically available and evaluated Steven Goldstein I managed the patient along with the ED Attending      Electronically Signed by         May Ta DO  07/11/19 4989
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
## 2019-07-11 NOTE — ASSESSMENT & PLAN NOTE
· Patient presents with acute on chronic back pain with left radicular symptoms  He has been seen multiple times in the ED for this issue as well as seen in the outpatient setting  He was scheduled to see Neurosurgery office tomorrow regarding possible surgical intervention given worsening pain with left trochanteric bursa injection  · Imaging reviewed personally with attending, results are as follows:  · MRI lumbar spine without contrast 07/06/2019:  Anterior fusion L4-5 and L5-S1  Limited post contrast imaging of the lumbar spine is suggested to evaluate proximal left L5 root, and bilateral spinal stenosis disc to radiculitis  Mild T11-12 related to facet arthropathy and right synovial facet cyst   Cord is not compressed    · Hold all anticoagulation/antiplatelet therapy  -patient reports Naprosyn and Excedrin use at home, would hold in the setting of surgical intervention tomorrow  · DVT prophylaxis:  SCDs, hold pharm DVT ppx in the setting of surgical intervention tomorrow  · Mobilize as tolerated with assistance, PT/OT  · Medical management and pain control per primary team  · After review of imaging with attending, will undergo surgical intervention tomorrow - left L1-2 diskectomy, left L2-3 foraminotomy, left L3-4 hemilaminectomy and bilateral foraminotomy  · Neurosurgery will continue to follow  · Preop orders and nose to toes protocol ordered, NPO after midnight  · Will administer DDAVP today given patient has history of Excedrin use  · Will order to units of platelets on hold in the OR  · Please call if questions or concerns

## 2019-07-11 NOTE — PROGRESS NOTES
Case  Reviewed in detail    MRI reviewed in detail          Plan   Left L1/2 diskectomy  Left L2/3 foraminotomy  Left L3/4 hemilaminectomy and bilateral foramintomy

## 2019-07-11 NOTE — PLAN OF CARE
Problem: Potential for Falls  Goal: Patient will remain free of falls  Description  INTERVENTIONS:  - Assess patient frequently for physical needs  -  Identify cognitive and physical deficits and behaviors that affect risk of falls    -  [place] fall precautions as indicated by assessment   - Educate patient/family on patient safety including physical limitations  - Instruct patient to call for assistance with activity based on assessment  - Modify environment to reduce risk of injury  - Consider OT/PT consult to assist with strengthening/mobility  Outcome: Progressing     Problem: PAIN - ADULT  Goal: Verbalizes/displays adequate comfort level or baseline comfort level  Description  Interventions:  - Encourage patient to monitor pain and request assistance  - Assess pain using appropriate pain scale  - Administer analgesics based on type and severity of pain and evaluate response  - Implement non-pharmacological measures as appropriate and evaluate response  - Consider cultural and social influences on pain and pain management  - Notify physician/advanced practitioner if interventions unsuccessful or patient reports new pain  Outcome: Progressing     Problem: INFECTION - ADULT  Goal: Absence or prevention of progression during hospitalization  Description  INTERVENTIONS:  - Assess and monitor for signs and symptoms of infection  - Monitor lab/diagnostic results  - Monitor all insertion sites, i e  indwelling lines, tubes, and drains  - Monitor endotracheal (as able) and nasal secretions for changes in amount and color  - [place] appropriate cooling/warming therapies per order  - Administer medications as ordered  - Instruct and encourage patient and family to use good hand hygiene technique  - Identify and instruct in appropriate isolation precautions for identified infection/condition  Outcome: Progressing  Goal: Absence of fever/infection during neutropenic period  Description  INTERVENTIONS:  - Monitor WBC  - Implement neutropenic guidelines  Outcome: Progressing     Problem: SAFETY ADULT  Goal: Maintain or return to baseline ADL function  Description  INTERVENTIONS:  -  Assess patient's ability to carry out ADLs; assess patient's baseline for ADL function and identify physical deficits which impact ability to perform ADLs (bathing, care of mouth/teeth, toileting, grooming, dressing, etc )  - Assess/evaluate cause of self-care deficits   - Assess range of motion  - Assess patient's mobility; develop plan if impaired  - Assess patient's need for assistive devices and provide as appropriate  - Encourage maximum independence but intervene and supervise when necessary  ¯ Involve family in performance of ADLs  ¯ Assess for home care needs following discharge   ¯ Request OT consult to assist with ADL evaluation and planning for discharge  ¯ Provide patient education as appropriate  Outcome: Progressing  Goal: Maintain or return mobility status to optimal level  Description  INTERVENTIONS:  - Assess patient's baseline mobility status (ambulation, transfers, stairs, etc )    - Identify cognitive and physical deficits and behaviors that affect mobility  - Identify mobility aids required to assist with transfers and/or ambulation (gait belt, sit-to-stand, lift, walker, cane, etc )  - [place] fall precautions as indicated by assessment  - Record patient progress and toleration of activity level on Mobility SBAR; progress patient to next Phase/Stage  - Instruct patient to call for assistance with activity based on assessment  - Request Rehabilitation consult to assist with strengthening/weightbearing, etc   Outcome: Progressing     Problem: DISCHARGE PLANNING  Goal: Discharge to home or other facility with appropriate resources  Description  INTERVENTIONS:  - Identify barriers to discharge w/patient and caregiver  - Arrange for needed discharge resources and transportation as appropriate  - Identify discharge learning needs (meds, wound care, etc )  - Arrange for interpretive services to assist at discharge as needed  - Refer to Case Management Department for coordinating discharge planning if the patient needs post-hospital services based on physician/advanced practitioner order or complex needs related to functional status, cognitive ability, or social support system  Outcome: Progressing     Problem: Knowledge Deficit  Goal: Patient/family/caregiver demonstrates understanding of disease process, treatment plan, medications, and discharge instructions  Description  Complete learning assessment and assess knowledge base    Interventions:  - Provide teaching at level of understanding  - Provide teaching via preferred learning methods  Outcome: Progressing
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
## 2019-07-11 NOTE — ED NOTES
Pt asking for more pain medicine, offered pt PRN tylenol that is ordered, pt refused        Mitchell Buck RN  07/11/19 9578

## 2019-07-12 ENCOUNTER — ANESTHESIA EVENT (INPATIENT)
Dept: PERIOP | Facility: HOSPITAL | Age: 64
DRG: 519 | End: 2019-07-12
Payer: MEDICARE

## 2019-07-12 ENCOUNTER — ANESTHESIA (INPATIENT)
Dept: PERIOP | Facility: HOSPITAL | Age: 64
DRG: 519 | End: 2019-07-12
Payer: MEDICARE

## 2019-07-12 ENCOUNTER — APPOINTMENT (INPATIENT)
Dept: RADIOLOGY | Facility: HOSPITAL | Age: 64
DRG: 519 | End: 2019-07-12
Payer: MEDICARE

## 2019-07-12 LAB
ALBUMIN SERPL BCP-MCNC: 2.8 G/DL (ref 3.5–5)
ALP SERPL-CCNC: 120 U/L (ref 46–116)
ALT SERPL W P-5'-P-CCNC: 75 U/L (ref 12–78)
ANION GAP SERPL CALCULATED.3IONS-SCNC: 4 MMOL/L (ref 4–13)
APTT PPP: 28 SECONDS (ref 23–37)
AST SERPL W P-5'-P-CCNC: 79 U/L (ref 5–45)
BILIRUB SERPL-MCNC: 0.2 MG/DL (ref 0.2–1)
BUN SERPL-MCNC: 18 MG/DL (ref 5–25)
CALCIUM SERPL-MCNC: 7.8 MG/DL (ref 8.3–10.1)
CHLORIDE SERPL-SCNC: 113 MMOL/L (ref 100–108)
CO2 SERPL-SCNC: 25 MMOL/L (ref 21–32)
CREAT SERPL-MCNC: 0.58 MG/DL (ref 0.6–1.3)
ERYTHROCYTE [DISTWIDTH] IN BLOOD BY AUTOMATED COUNT: 14.6 % (ref 11.6–15.1)
GFR SERPL CREATININE-BSD FRML MDRD: 108 ML/MIN/1.73SQ M
GLUCOSE SERPL-MCNC: 83 MG/DL (ref 65–140)
HCT VFR BLD AUTO: 37.3 % (ref 36.5–49.3)
HGB BLD-MCNC: 11.8 G/DL (ref 12–17)
INR PPP: 1.15 (ref 0.84–1.19)
MCH RBC QN AUTO: 32 PG (ref 26.8–34.3)
MCHC RBC AUTO-ENTMCNC: 31.6 G/DL (ref 31.4–37.4)
MCV RBC AUTO: 101 FL (ref 82–98)
PLATELET # BLD AUTO: 153 THOUSANDS/UL (ref 149–390)
PLATELET # BLD AUTO: 171 THOUSANDS/UL (ref 149–390)
PMV BLD AUTO: 10.1 FL (ref 8.9–12.7)
PMV BLD AUTO: 9.4 FL (ref 8.9–12.7)
POTASSIUM SERPL-SCNC: 3.8 MMOL/L (ref 3.5–5.3)
PROT SERPL-MCNC: 5.9 G/DL (ref 6.4–8.2)
PROTHROMBIN TIME: 14.3 SECONDS (ref 11.6–14.5)
RBC # BLD AUTO: 3.69 MILLION/UL (ref 3.88–5.62)
SODIUM SERPL-SCNC: 142 MMOL/L (ref 136–145)
WBC # BLD AUTO: 4.52 THOUSAND/UL (ref 4.31–10.16)

## 2019-07-12 PROCEDURE — 85027 COMPLETE CBC AUTOMATED: CPT | Performed by: STUDENT IN AN ORGANIZED HEALTH CARE EDUCATION/TRAINING PROGRAM

## 2019-07-12 PROCEDURE — 80053 COMPREHEN METABOLIC PANEL: CPT | Performed by: STUDENT IN AN ORGANIZED HEALTH CARE EDUCATION/TRAINING PROGRAM

## 2019-07-12 PROCEDURE — 85049 AUTOMATED PLATELET COUNT: CPT | Performed by: NEUROLOGICAL SURGERY

## 2019-07-12 PROCEDURE — 72020 X-RAY EXAM OF SPINE 1 VIEW: CPT

## 2019-07-12 PROCEDURE — 85610 PROTHROMBIN TIME: CPT | Performed by: INTERNAL MEDICINE

## 2019-07-12 PROCEDURE — NC001 PR NO CHARGE: Performed by: NEUROLOGICAL SURGERY

## 2019-07-12 PROCEDURE — 63047 LAM FACETEC & FORAMOT LUMBAR: CPT | Performed by: NEUROLOGICAL SURGERY

## 2019-07-12 PROCEDURE — 63030 LAMOT DCMPRN NRV RT 1 LMBR: CPT | Performed by: NEUROLOGICAL SURGERY

## 2019-07-12 PROCEDURE — 0SB20ZZ EXCISION OF LUMBAR VERTEBRAL DISC, OPEN APPROACH: ICD-10-PCS | Performed by: NEUROLOGICAL SURGERY

## 2019-07-12 PROCEDURE — 63048 LAM FACETEC &FORAMOT EA ADDL: CPT | Performed by: NEUROLOGICAL SURGERY

## 2019-07-12 PROCEDURE — 99232 SBSQ HOSP IP/OBS MODERATE 35: CPT | Performed by: INTERNAL MEDICINE

## 2019-07-12 PROCEDURE — 85730 THROMBOPLASTIN TIME PARTIAL: CPT | Performed by: INTERNAL MEDICINE

## 2019-07-12 PROCEDURE — 00NY0ZZ RELEASE LUMBAR SPINAL CORD, OPEN APPROACH: ICD-10-PCS | Performed by: NEUROLOGICAL SURGERY

## 2019-07-12 RX ORDER — FENTANYL CITRATE/PF 50 MCG/ML
50 SYRINGE (ML) INJECTION
Status: DISCONTINUED | OUTPATIENT
Start: 2019-07-12 | End: 2019-07-12 | Stop reason: HOSPADM

## 2019-07-12 RX ORDER — FENTANYL CITRATE/PF 50 MCG/ML
25 SYRINGE (ML) INJECTION
Status: DISCONTINUED | OUTPATIENT
Start: 2019-07-12 | End: 2019-07-12 | Stop reason: HOSPADM

## 2019-07-12 RX ORDER — SODIUM CHLORIDE 9 MG/ML
INJECTION, SOLUTION INTRAVENOUS CONTINUOUS PRN
Status: DISCONTINUED | OUTPATIENT
Start: 2019-07-12 | End: 2019-07-12 | Stop reason: SURG

## 2019-07-12 RX ORDER — PROPOFOL 10 MG/ML
INJECTION, EMULSION INTRAVENOUS AS NEEDED
Status: DISCONTINUED | OUTPATIENT
Start: 2019-07-12 | End: 2019-07-12 | Stop reason: SURG

## 2019-07-12 RX ORDER — VANCOMYCIN HYDROCHLORIDE 1 G/20ML
INJECTION, POWDER, LYOPHILIZED, FOR SOLUTION INTRAVENOUS AS NEEDED
Status: DISCONTINUED | OUTPATIENT
Start: 2019-07-12 | End: 2019-07-12 | Stop reason: SURG

## 2019-07-12 RX ORDER — ONDANSETRON 2 MG/ML
INJECTION INTRAMUSCULAR; INTRAVENOUS AS NEEDED
Status: DISCONTINUED | OUTPATIENT
Start: 2019-07-12 | End: 2019-07-12 | Stop reason: SURG

## 2019-07-12 RX ORDER — GABAPENTIN 300 MG/1
900 CAPSULE ORAL ONCE
Status: COMPLETED | OUTPATIENT
Start: 2019-07-12 | End: 2019-07-12

## 2019-07-12 RX ORDER — HYDROMORPHONE HCL/PF 1 MG/ML
0.2 SYRINGE (ML) INJECTION
Status: DISCONTINUED | OUTPATIENT
Start: 2019-07-12 | End: 2019-07-12 | Stop reason: HOSPADM

## 2019-07-12 RX ORDER — BUPIVACAINE HYDROCHLORIDE AND EPINEPHRINE 5; 5 MG/ML; UG/ML
INJECTION, SOLUTION EPIDURAL; INTRACAUDAL; PERINEURAL AS NEEDED
Status: DISCONTINUED | OUTPATIENT
Start: 2019-07-12 | End: 2019-07-12 | Stop reason: HOSPADM

## 2019-07-12 RX ORDER — KETAMINE HYDROCHLORIDE 50 MG/ML
INJECTION, SOLUTION, CONCENTRATE INTRAMUSCULAR; INTRAVENOUS AS NEEDED
Status: DISCONTINUED | OUTPATIENT
Start: 2019-07-12 | End: 2019-07-12 | Stop reason: SURG

## 2019-07-12 RX ORDER — DIPHENHYDRAMINE HYDROCHLORIDE 50 MG/ML
12.5 INJECTION INTRAMUSCULAR; INTRAVENOUS ONCE AS NEEDED
Status: DISCONTINUED | OUTPATIENT
Start: 2019-07-12 | End: 2019-07-12 | Stop reason: HOSPADM

## 2019-07-12 RX ORDER — HEPARIN SODIUM 5000 [USP'U]/ML
5000 INJECTION, SOLUTION INTRAVENOUS; SUBCUTANEOUS EVERY 8 HOURS SCHEDULED
Status: DISCONTINUED | OUTPATIENT
Start: 2019-07-12 | End: 2019-07-14 | Stop reason: HOSPADM

## 2019-07-12 RX ORDER — LIDOCAINE HYDROCHLORIDE AND EPINEPHRINE 10; 10 MG/ML; UG/ML
INJECTION, SOLUTION INFILTRATION; PERINEURAL AS NEEDED
Status: DISCONTINUED | OUTPATIENT
Start: 2019-07-12 | End: 2019-07-12 | Stop reason: HOSPADM

## 2019-07-12 RX ORDER — DOCUSATE SODIUM 100 MG/1
100 CAPSULE, LIQUID FILLED ORAL 2 TIMES DAILY
Status: DISCONTINUED | OUTPATIENT
Start: 2019-07-12 | End: 2019-07-14 | Stop reason: HOSPADM

## 2019-07-12 RX ORDER — ALBUMIN, HUMAN INJ 5% 5 %
SOLUTION INTRAVENOUS CONTINUOUS PRN
Status: DISCONTINUED | OUTPATIENT
Start: 2019-07-12 | End: 2019-07-12 | Stop reason: SURG

## 2019-07-12 RX ORDER — METOCLOPRAMIDE HYDROCHLORIDE 5 MG/ML
10 INJECTION INTRAMUSCULAR; INTRAVENOUS ONCE AS NEEDED
Status: DISCONTINUED | OUTPATIENT
Start: 2019-07-12 | End: 2019-07-12 | Stop reason: HOSPADM

## 2019-07-12 RX ORDER — SODIUM CHLORIDE 9 MG/ML
75 INJECTION, SOLUTION INTRAVENOUS CONTINUOUS
Status: DISCONTINUED | OUTPATIENT
Start: 2019-07-12 | End: 2019-07-13

## 2019-07-12 RX ORDER — LIDOCAINE HYDROCHLORIDE 10 MG/ML
INJECTION, SOLUTION INFILTRATION; PERINEURAL AS NEEDED
Status: DISCONTINUED | OUTPATIENT
Start: 2019-07-12 | End: 2019-07-12 | Stop reason: SURG

## 2019-07-12 RX ORDER — ACETAMINOPHEN 325 MG/1
975 TABLET ORAL EVERY 6 HOURS PRN
Status: DISCONTINUED | OUTPATIENT
Start: 2019-07-12 | End: 2019-07-14 | Stop reason: HOSPADM

## 2019-07-12 RX ORDER — ONDANSETRON 2 MG/ML
4 INJECTION INTRAMUSCULAR; INTRAVENOUS EVERY 6 HOURS PRN
Status: DISCONTINUED | OUTPATIENT
Start: 2019-07-12 | End: 2019-07-14 | Stop reason: HOSPADM

## 2019-07-12 RX ORDER — ACETAMINOPHEN 325 MG/1
975 TABLET ORAL ONCE
Status: COMPLETED | OUTPATIENT
Start: 2019-07-12 | End: 2019-07-12

## 2019-07-12 RX ORDER — FENTANYL CITRATE 50 UG/ML
INJECTION, SOLUTION INTRAMUSCULAR; INTRAVENOUS AS NEEDED
Status: DISCONTINUED | OUTPATIENT
Start: 2019-07-12 | End: 2019-07-12 | Stop reason: SURG

## 2019-07-12 RX ORDER — DIAZEPAM 5 MG/1
5 TABLET ORAL EVERY 6 HOURS PRN
Status: DISCONTINUED | OUTPATIENT
Start: 2019-07-12 | End: 2019-07-14 | Stop reason: HOSPADM

## 2019-07-12 RX ORDER — METHYLPREDNISOLONE ACETATE 40 MG/ML
INJECTION, SUSPENSION INTRA-ARTICULAR; INTRALESIONAL; INTRAMUSCULAR; SOFT TISSUE AS NEEDED
Status: DISCONTINUED | OUTPATIENT
Start: 2019-07-12 | End: 2019-07-12 | Stop reason: HOSPADM

## 2019-07-12 RX ORDER — HYDROMORPHONE HCL/PF 1 MG/ML
0.5 SYRINGE (ML) INJECTION
Status: DISCONTINUED | OUTPATIENT
Start: 2019-07-12 | End: 2019-07-12 | Stop reason: HOSPADM

## 2019-07-12 RX ORDER — ONDANSETRON 2 MG/ML
4 INJECTION INTRAMUSCULAR; INTRAVENOUS ONCE AS NEEDED
Status: DISCONTINUED | OUTPATIENT
Start: 2019-07-12 | End: 2019-07-12 | Stop reason: HOSPADM

## 2019-07-12 RX ORDER — OXYCODONE HYDROCHLORIDE 5 MG/1
5 TABLET ORAL EVERY 4 HOURS PRN
Status: DISCONTINUED | OUTPATIENT
Start: 2019-07-12 | End: 2019-07-14 | Stop reason: HOSPADM

## 2019-07-12 RX ORDER — MIDAZOLAM HYDROCHLORIDE 1 MG/ML
INJECTION INTRAMUSCULAR; INTRAVENOUS AS NEEDED
Status: DISCONTINUED | OUTPATIENT
Start: 2019-07-12 | End: 2019-07-12 | Stop reason: SURG

## 2019-07-12 RX ORDER — HYDROMORPHONE HCL/PF 1 MG/ML
0.5 SYRINGE (ML) INJECTION
Status: DISCONTINUED | OUTPATIENT
Start: 2019-07-12 | End: 2019-07-14 | Stop reason: HOSPADM

## 2019-07-12 RX ORDER — ROCURONIUM BROMIDE 10 MG/ML
INJECTION, SOLUTION INTRAVENOUS AS NEEDED
Status: DISCONTINUED | OUTPATIENT
Start: 2019-07-12 | End: 2019-07-12 | Stop reason: SURG

## 2019-07-12 RX ORDER — BISACODYL 10 MG
10 SUPPOSITORY, RECTAL RECTAL DAILY PRN
Status: DISCONTINUED | OUTPATIENT
Start: 2019-07-12 | End: 2019-07-14 | Stop reason: HOSPADM

## 2019-07-12 RX ORDER — CHLORHEXIDINE GLUCONATE 0.12 MG/ML
15 RINSE ORAL EVERY 12 HOURS SCHEDULED
Status: DISCONTINUED | OUTPATIENT
Start: 2019-07-12 | End: 2019-07-12 | Stop reason: HOSPADM

## 2019-07-12 RX ORDER — EPHEDRINE SULFATE 50 MG/ML
INJECTION INTRAVENOUS AS NEEDED
Status: DISCONTINUED | OUTPATIENT
Start: 2019-07-12 | End: 2019-07-12 | Stop reason: SURG

## 2019-07-12 RX ORDER — OXYCODONE HYDROCHLORIDE 10 MG/1
10 TABLET ORAL ONCE
Status: COMPLETED | OUTPATIENT
Start: 2019-07-12 | End: 2019-07-12

## 2019-07-12 RX ORDER — LIDOCAINE HYDROCHLORIDE 10 MG/ML
0.5 INJECTION, SOLUTION EPIDURAL; INFILTRATION; INTRACAUDAL; PERINEURAL ONCE AS NEEDED
Status: CANCELLED | OUTPATIENT
Start: 2019-07-12

## 2019-07-12 RX ORDER — HYDROMORPHONE HYDROCHLORIDE 2 MG/ML
INJECTION, SOLUTION INTRAMUSCULAR; INTRAVENOUS; SUBCUTANEOUS AS NEEDED
Status: DISCONTINUED | OUTPATIENT
Start: 2019-07-12 | End: 2019-07-12 | Stop reason: SURG

## 2019-07-12 RX ORDER — DEXAMETHASONE SODIUM PHOSPHATE 10 MG/ML
INJECTION, SOLUTION INTRAMUSCULAR; INTRAVENOUS AS NEEDED
Status: DISCONTINUED | OUTPATIENT
Start: 2019-07-12 | End: 2019-07-12 | Stop reason: SURG

## 2019-07-12 RX ORDER — OXYCODONE HYDROCHLORIDE 10 MG/1
10 TABLET ORAL EVERY 4 HOURS PRN
Status: DISCONTINUED | OUTPATIENT
Start: 2019-07-12 | End: 2019-07-14 | Stop reason: HOSPADM

## 2019-07-12 RX ORDER — METHOCARBAMOL 500 MG/1
500 TABLET, FILM COATED ORAL EVERY 6 HOURS PRN
Status: DISCONTINUED | OUTPATIENT
Start: 2019-07-12 | End: 2019-07-14 | Stop reason: HOSPADM

## 2019-07-12 RX ORDER — GLYCOPYRROLATE 0.2 MG/ML
INJECTION INTRAMUSCULAR; INTRAVENOUS AS NEEDED
Status: DISCONTINUED | OUTPATIENT
Start: 2019-07-12 | End: 2019-07-12 | Stop reason: SURG

## 2019-07-12 RX ADMIN — HEPARIN SODIUM 5000 UNITS: 5000 INJECTION INTRAVENOUS; SUBCUTANEOUS at 16:16

## 2019-07-12 RX ADMIN — KETAMINE HYDROCHLORIDE 50 MG: 50 INJECTION, SOLUTION INTRAMUSCULAR; INTRAVENOUS at 11:10

## 2019-07-12 RX ADMIN — HYDROMORPHONE HYDROCHLORIDE 0.5 MG: 1 INJECTION, SOLUTION INTRAMUSCULAR; INTRAVENOUS; SUBCUTANEOUS at 17:35

## 2019-07-12 RX ADMIN — LIDOCAINE HYDROCHLORIDE 50 MG: 10 INJECTION, SOLUTION INFILTRATION; PERINEURAL at 11:05

## 2019-07-12 RX ADMIN — HYDROMORPHONE HYDROCHLORIDE 0.5 MG: 1 INJECTION, SOLUTION INTRAMUSCULAR; INTRAVENOUS; SUBCUTANEOUS at 21:57

## 2019-07-12 RX ADMIN — PROPOFOL 150 MG: 10 INJECTION, EMULSION INTRAVENOUS at 11:05

## 2019-07-12 RX ADMIN — EPHEDRINE SULFATE 5 MG: 50 INJECTION, SOLUTION INTRAVENOUS at 12:29

## 2019-07-12 RX ADMIN — DULOXETINE HYDROCHLORIDE 60 MG: 60 CAPSULE, DELAYED RELEASE ORAL at 09:37

## 2019-07-12 RX ADMIN — EPHEDRINE SULFATE 10 MG: 50 INJECTION, SOLUTION INTRAVENOUS at 13:15

## 2019-07-12 RX ADMIN — HYDROMORPHONE HYDROCHLORIDE 1 MG: 2 INJECTION, SOLUTION INTRAMUSCULAR; INTRAVENOUS; SUBCUTANEOUS at 14:04

## 2019-07-12 RX ADMIN — VANCOMYCIN HYDROCHLORIDE 1 G: 1 INJECTION, POWDER, LYOPHILIZED, FOR SOLUTION INTRAVENOUS at 11:10

## 2019-07-12 RX ADMIN — DEXAMETHASONE SODIUM PHOSPHATE 10 MG: 10 INJECTION, SOLUTION INTRAMUSCULAR; INTRAVENOUS at 11:10

## 2019-07-12 RX ADMIN — SODIUM CHLORIDE 75 ML/HR: 0.9 INJECTION, SOLUTION INTRAVENOUS at 16:31

## 2019-07-12 RX ADMIN — EPHEDRINE SULFATE 5 MG: 50 INJECTION, SOLUTION INTRAVENOUS at 12:27

## 2019-07-12 RX ADMIN — SODIUM CHLORIDE 125 ML/HR: 0.9 INJECTION, SOLUTION INTRAVENOUS at 02:59

## 2019-07-12 RX ADMIN — EPHEDRINE SULFATE 10 MG: 50 INJECTION, SOLUTION INTRAVENOUS at 11:25

## 2019-07-12 RX ADMIN — EPHEDRINE SULFATE 10 MG: 50 INJECTION, SOLUTION INTRAVENOUS at 11:39

## 2019-07-12 RX ADMIN — OXYCODONE HYDROCHLORIDE 10 MG: 10 TABLET ORAL at 16:18

## 2019-07-12 RX ADMIN — EPHEDRINE SULFATE 10 MG: 50 INJECTION, SOLUTION INTRAVENOUS at 11:22

## 2019-07-12 RX ADMIN — HYDROMORPHONE HYDROCHLORIDE 0.5 MG: 1 INJECTION, SOLUTION INTRAMUSCULAR; INTRAVENOUS; SUBCUTANEOUS at 05:26

## 2019-07-12 RX ADMIN — CHLORHEXIDINE GLUCONATE 15 ML: 1.2 RINSE ORAL at 09:45

## 2019-07-12 RX ADMIN — SODIUM CHLORIDE 75 ML/HR: 0.9 INJECTION, SOLUTION INTRAVENOUS at 23:00

## 2019-07-12 RX ADMIN — PHENYLEPHRINE HYDROCHLORIDE 40 MCG/MIN: 10 INJECTION INTRAVENOUS at 11:39

## 2019-07-12 RX ADMIN — NICOTINE 1 PATCH: 14 PATCH TRANSDERMAL at 09:36

## 2019-07-12 RX ADMIN — EPHEDRINE SULFATE 10 MG: 50 INJECTION, SOLUTION INTRAVENOUS at 12:21

## 2019-07-12 RX ADMIN — PHENYLEPHRINE HYDROCHLORIDE 100 MCG: 10 INJECTION INTRAVENOUS at 11:13

## 2019-07-12 RX ADMIN — GLYCOPYRROLATE 0.2 MG: 0.2 INJECTION, SOLUTION INTRAMUSCULAR; INTRAVENOUS at 11:17

## 2019-07-12 RX ADMIN — MIDAZOLAM 2 MG: 1 INJECTION INTRAMUSCULAR; INTRAVENOUS at 10:48

## 2019-07-12 RX ADMIN — HEPARIN SODIUM 5000 UNITS: 5000 INJECTION INTRAVENOUS; SUBCUTANEOUS at 21:56

## 2019-07-12 RX ADMIN — METHOCARBAMOL 500 MG: 500 TABLET, FILM COATED ORAL at 19:30

## 2019-07-12 RX ADMIN — DOCUSATE SODIUM 100 MG: 100 CAPSULE, LIQUID FILLED ORAL at 17:35

## 2019-07-12 RX ADMIN — EPHEDRINE SULFATE 15 MG: 50 INJECTION, SOLUTION INTRAVENOUS at 13:33

## 2019-07-12 RX ADMIN — ROCURONIUM BROMIDE 50 MG: 10 INJECTION, SOLUTION INTRAVENOUS at 11:06

## 2019-07-12 RX ADMIN — OXYCODONE HYDROCHLORIDE 10 MG: 10 TABLET ORAL at 03:06

## 2019-07-12 RX ADMIN — OXYCODONE HYDROCHLORIDE 10 MG: 10 TABLET ORAL at 10:25

## 2019-07-12 RX ADMIN — DEXMEDETOMIDINE HYDROCHLORIDE 0.2 MCG/KG/HR: 100 INJECTION, SOLUTION INTRAVENOUS at 11:05

## 2019-07-12 RX ADMIN — EPHEDRINE SULFATE 10 MG: 50 INJECTION, SOLUTION INTRAVENOUS at 11:28

## 2019-07-12 RX ADMIN — SODIUM CHLORIDE: 0.9 INJECTION, SOLUTION INTRAVENOUS at 12:15

## 2019-07-12 RX ADMIN — KETAMINE HYDROCHLORIDE 0.05 MG/KG/HR: 50 INJECTION, SOLUTION INTRAMUSCULAR; INTRAVENOUS at 11:05

## 2019-07-12 RX ADMIN — FENTANYL CITRATE 100 MCG: 50 INJECTION, SOLUTION INTRAMUSCULAR; INTRAVENOUS at 11:05

## 2019-07-12 RX ADMIN — ALBUMIN (HUMAN): 12.5 SOLUTION INTRAVENOUS at 13:19

## 2019-07-12 RX ADMIN — EPHEDRINE SULFATE 15 MG: 50 INJECTION, SOLUTION INTRAVENOUS at 11:42

## 2019-07-12 RX ADMIN — GABAPENTIN 900 MG: 300 CAPSULE ORAL at 10:16

## 2019-07-12 RX ADMIN — ROCURONIUM BROMIDE 20 MG: 10 INJECTION, SOLUTION INTRAVENOUS at 12:23

## 2019-07-12 RX ADMIN — EPHEDRINE SULFATE 30 MG: 50 INJECTION, SOLUTION INTRAVENOUS at 11:13

## 2019-07-12 RX ADMIN — ONDANSETRON 4 MG: 2 INJECTION INTRAMUSCULAR; INTRAVENOUS at 14:03

## 2019-07-12 RX ADMIN — ACETAMINOPHEN 975 MG: 325 TABLET ORAL at 10:18

## 2019-07-12 RX ADMIN — OXYCODONE HYDROCHLORIDE 10 MG: 10 TABLET ORAL at 09:35

## 2019-07-12 RX ADMIN — SODIUM CHLORIDE: 0.9 INJECTION, SOLUTION INTRAVENOUS at 10:48

## 2019-07-12 RX ADMIN — SUGAMMADEX 200 MG: 100 INJECTION, SOLUTION INTRAVENOUS at 13:57

## 2019-07-12 RX ADMIN — SENNOSIDES AND DOCUSATE SODIUM 1 TABLET: 8.6; 5 TABLET ORAL at 22:03

## 2019-07-12 RX ADMIN — FENTANYL CITRATE 50 MCG: 50 INJECTION INTRAMUSCULAR; INTRAVENOUS at 15:04

## 2019-07-12 RX ADMIN — OXYCODONE HYDROCHLORIDE 10 MG: 10 TABLET ORAL at 20:45
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
## 2019-07-12 NOTE — PHYSICIAN ADVISOR
Current patient class: Inpatient  The patient is currently on Hospital Day: 2 at [address] [place] [address]      The patient was admitted to the hospital at  5:17 PM on 7/11/19 for the following diagnosis:  Back pain [M54 9]  Radiculopathy [M54 10]  Acute midline low back pain with left-sided sciatica [M54 42]  Chronic left-sided low back pain without sciatica [M54 5, G89 29]  Spinal stenosis of lumbar region, unspecified whether neurogenic claudication present [M48 061]       There is documentation in the medical record of an expected length of stay of at least 2 midnights  The patient is therefore expected to satisfy the 2 midnight benchmark and given the 2 midnight presumption is appropriate for INPATIENT ADMISSION  Given this expectation of a satisfying stay, CMS instructs us that the patient is most often appropriate for inpatient admission under part A provided medical necessity is documented in the chart  After review of the relevant documentation, labs, vital signs and test results, the patient is appropriate for INPATIENT ADMISSION  Admission to the hospital as an inpatient is a complex decision making process which requires the practitioner to consider the patients presenting complaint, history and physical examination and all relevant testing  With this in mind, in this case, the patient was deemed appropriate for INPATIENT ADMISSION  After review of the documentation and testing available at the time of the admission I concur with this clinical determination of medical necessity  Rationale is as follows: The patient is a 58 yrs old Male who presented to the ED at 7/11/2019  1:08 PM with a chief complaint of Back Pain (Pt c/o left sided low back pain and left leg pain  Pt states was admitted 2 weeks ago for same  Pt states pain is so bad he can not take it )     The patient presented with mid back pain with radiation to left thigh into the knee    The patient is being admitted secondary to chronic left-sided lower back pain  The plan of care includes surgical intervention including diskectomy, foraminotomy of the lumbar vertebrae  The patient is to be NPO after midnight  This patient is appropriate for inpatient admission continued hospitalization is necessary to ensure stabilization of his clinical status      The patients vitals on arrival were ED Triage Vitals   Temperature Pulse Respirations Blood Pressure SpO2   07/12/19 0027 07/11/19 1243 07/11/19 1243 07/11/19 1243 07/11/19 1243   97 9 °F (36 6 °C) 104 18 140/89 97 %      Temp Source Heart Rate Source Patient Position - Orthostatic VS BP Location FiO2 (%)   07/12/19 0027 07/11/19 1243 07/11/19 1243 07/11/19 1243 --   Oral Monitor Sitting Left arm       Pain Score       07/11/19 1243       8           Past Medical History:   Diagnosis Date    Chronic back pain     COPD (chronic obstructive pulmonary disease) (HCC)     Lumbar herniated disc      Past Surgical History:   Procedure Laterality Date    BACK SURGERY      NECK SURGERY      SHOULDER SURGERY      SPINE SURGERY             Consults have been placed to:   IP CONSULT TO NEUROSURGERY    Vitals:    07/12/19 1500 07/12/19 1515 07/12/19 1553 07/12/19 1621   BP: 111/63 116/65 97/58 111/57   BP Location:   Right arm Right arm   Pulse: 64 64 71 63   Resp: 13 14 16 16   Temp: (!) 97 4 °F (36 3 °C)      TempSrc:       SpO2: 94% 95% 94%    Weight:       Height:           Most recent labs:    Recent Labs     07/12/19  0456   WBC 4 52   HGB 11 8*   HCT 37 3      K 3 8   CALCIUM 7 8*   BUN 18   CREATININE 0 58*   INR 1 15   AST 79*   ALT 75   ALKPHOS 120*       Scheduled Meds:  Current Facility-Administered Medications:  acetaminophen 650 mg Oral Q6H PRN Kiara Harris MD    albuterol 1 puff Inhalation Q4H PRN Kiara Harris MD    bisacodyl 10 mg Rectal Daily PRN Kiara Harris MD    docusate sodium 100 mg Oral BID Kiara Harris MD    DULoxetine 60 mg Oral Daily Raffaele Chester MD    heparin (porcine) 5,000 Units Subcutaneous [facility] Raffaele Chester MD    HYDROmorphone 0 5 mg Intravenous Q6H PRN Raffaele Chester MD    methocarbamol 500 mg Oral Q6H PRN Raffaele Chester MD    nicotine 1 patch Transdermal Daily Raffaele Chester MD    ondansetron 4 mg Intravenous Q6H PRN Raffaele Chester MD    oxyCODONE 10 mg Oral Q6H PRN Raffaele Chester MD    oxyCODONE 5 mg Oral Q6H PRN Raffaele Chester MD    senna-docusate sodium 1 tablet Oral HS Raffaele Chester MD    sodium chloride 125 mL/hr Intravenous Continuous Raffaele Chester MD Last Rate: Stopped (07/12/19 1400)   sodium chloride 75 mL/hr Intravenous Continuous Raffaele Chester MD Last Rate: 75 mL/hr (07/12/19 1631)     Continuous Infusions:  sodium chloride 125 mL/hr Last Rate: Stopped (07/12/19 1400)   sodium chloride 75 mL/hr Last Rate: 75 mL/hr (07/12/19 1631)     PRN Meds:   acetaminophen    albuterol    bisacodyl    HYDROmorphone    methocarbamol    ondansetron    oxyCODONE    oxyCODONE    Surgical procedures (if appropriate):  Procedure(s):  Metrx minimally invasive left L1-2 discectomy, left L2-3 foraminotomy, left L3-4 hemilaminectomy and bilateral foramintomy

## 2019-07-12 NOTE — ANESTHESIA POSTPROCEDURE EVALUATION
Post-Op Assessment Note    CV Status:  Stable  Pain Score: 0    Pain management: adequate     Mental Status:  Alert and awake   Hydration Status:  Euvolemic   PONV Controlled:  Controlled   Airway Patency:  Patent   Post Op Vitals Reviewed: Yes      Staff: Anesthesiologist, CRNA           BP   103/64   Temp   97 7   Pulse  66   Resp   16   SpO2   92

## 2019-07-12 NOTE — PLAN OF CARE
Problem: Potential for Falls  Goal: Patient will remain free of falls  Description  INTERVENTIONS:  - Assess patient frequently for physical needs  -  Identify cognitive and physical deficits and behaviors that affect risk of falls    -  [place] fall precautions as indicated by assessment   - Educate patient/family on patient safety including physical limitations  - Instruct patient to call for assistance with activity based on assessment  - Modify environment to reduce risk of injury  - Consider OT/PT consult to assist with strengthening/mobility  Outcome: Progressing     Problem: PAIN - ADULT  Goal: Verbalizes/displays adequate comfort level or baseline comfort level  Description  Interventions:  - Encourage patient to monitor pain and request assistance  - Assess pain using appropriate pain scale  - Administer analgesics based on type and severity of pain and evaluate response  - Implement non-pharmacological measures as appropriate and evaluate response  - Consider cultural and social influences on pain and pain management  - Notify physician/advanced practitioner if interventions unsuccessful or patient reports new pain  Outcome: Progressing     Problem: INFECTION - ADULT  Goal: Absence or prevention of progression during hospitalization  Description  INTERVENTIONS:  - Assess and monitor for signs and symptoms of infection  - Monitor lab/diagnostic results  - Monitor all insertion sites, i e  indwelling lines, tubes, and drains  - Monitor endotracheal (as able) and nasal secretions for changes in amount and color  - [place] appropriate cooling/warming therapies per order  - Administer medications as ordered  - Instruct and encourage patient and family to use good hand hygiene technique  - Identify and instruct in appropriate isolation precautions for identified infection/condition  Outcome: Progressing  Goal: Absence of fever/infection during neutropenic period  Description  INTERVENTIONS:  - Monitor WBC  - Implement neutropenic guidelines  Outcome: Progressing     Problem: SAFETY ADULT  Goal: Maintain or return to baseline ADL function  Description  INTERVENTIONS:  -  Assess patient's ability to carry out ADLs; assess patient's baseline for ADL function and identify physical deficits which impact ability to perform ADLs (bathing, care of mouth/teeth, toileting, grooming, dressing, etc )  - Assess/evaluate cause of self-care deficits   - Assess range of motion  - Assess patient's mobility; develop plan if impaired  - Assess patient's need for assistive devices and provide as appropriate  - Encourage maximum independence but intervene and supervise when necessary  ¯ Involve family in performance of ADLs  ¯ Assess for home care needs following discharge   ¯ Request OT consult to assist with ADL evaluation and planning for discharge  ¯ Provide patient education as appropriate  Outcome: Progressing  Goal: Maintain or return mobility status to optimal level  Description  INTERVENTIONS:  - Assess patient's baseline mobility status (ambulation, transfers, stairs, etc )    - Identify cognitive and physical deficits and behaviors that affect mobility  - Identify mobility aids required to assist with transfers and/or ambulation (gait belt, sit-to-stand, lift, walker, cane, etc )  - [place] fall precautions as indicated by assessment  - Record patient progress and toleration of activity level on Mobility SBAR; progress patient to next Phase/Stage  - Instruct patient to call for assistance with activity based on assessment  - Request Rehabilitation consult to assist with strengthening/weightbearing, etc   Outcome: Progressing     Problem: DISCHARGE PLANNING  Goal: Discharge to home or other facility with appropriate resources  Description  INTERVENTIONS:  - Identify barriers to discharge w/patient and caregiver  - Arrange for needed discharge resources and transportation as appropriate  - Identify discharge learning needs (meds, wound care, etc )  - Arrange for interpretive services to assist at discharge as needed  - Refer to Case Management Department for coordinating discharge planning if the patient needs post-hospital services based on physician/advanced practitioner order or complex needs related to functional status, cognitive ability, or social support system  Outcome: Progressing     Problem: Knowledge Deficit  Goal: Patient/family/caregiver demonstrates understanding of disease process, treatment plan, medications, and discharge instructions  Description  Complete learning assessment and assess knowledge base    Interventions:  - Provide teaching at level of understanding  - Provide teaching via preferred learning methods  Outcome: Progressing

## 2019-07-12 NOTE — ED ATTENDING ATTESTATION
Jazmín Camejo MD, saw and evaluated the patient  I have discussed the patient with the resident/non-physician practitioner and agree with the resident's/non-physician practitioner's findings, Plan of Care, and MDM as documented in the resident's/non-physician practitioner's note, except where noted  All available labs and Radiology studies were reviewed  I was present for key portions of any procedure(s) performed by the resident/non-physician practitioner and I was immediately available to provide assistance  At this point I agree with the current assessment done in the Emergency Department    I have conducted an independent evaluation of this patient a history and physical is as follows:     the patient presents for evaluation of back pain back pain is worsening left paraspinal lumbar area with radicular symptoms to the left leg he had an MRI 5 days goes with severe so severe spinal stenosis with disc protrusion  Patient is scheduled to see neurosurgery tomorrow he is unable sleep the last straight days secondary to the pain he states his legs weak and painful and is having difficulty walking because of it he has no urinary symptoms no bowel or bladder difficulty no paresthesias in the groin no fever or chills no history of cancer   exam the patient is in moderate distress HEENT unremarkable lungs clear heart regular abdomen soft nontender back exam tenderness noted left paraspinal spinal area there is no midline tenderness patient has positive straight leg raising on the left he has weakness in the ankle flexion and the extensor pollicis longus sensation is intact pulses are intact   impression severe back pain with radicular symptoms secondary to herniated disc will admit for pain control and neurosurgical evaluation  Critical Care Time  Procedures

## 2019-07-12 NOTE — OCCUPATIONAL THERAPY NOTE
Occupational Therapy         Patient Name: Kaitlynn Garcia  NLVPR'D Date: 7/12/2019        OT orders received and chart reviewed- Pt for OR today with NS - will defer and address OT needs post op    Eva Cary, OT

## 2019-07-12 NOTE — PROGRESS NOTES
IM Residency Progress Note   Unit/Bed#: -01 Encounter: [id number]  SOD Team C       Armand Barney 58 y o  male [id number]    Hospital Stay Days: 1      Assessment/Plan:    Principal Problem:    Chronic left-sided low back pain without sciatica  Active Problems:    Leg pain    Tobacco abuse    COPD (chronic obstructive pulmonary disease) ([facility])    Spinal stenosis    Opioid dependence ([facility])    Chronic back pain with radiculopathy secondary to spinal stenosis  Patient presented to the OR with severe back pain radiating to the left testicle left thigh  Associated with weakness decreased sensation numbness and tingling  History of chronic back pain status post for lower lumbar surgeries   - MRI of the lumbar spine showed anterior fusion of L4-5 and L5-S1, with severe left foraminal bilateral stenosis at L3-L4 level and extruded disc L1-L2  - neurosurgery consulted planned to take to the OR today for a left L1-2 2 diskectomy, left L2-3 formaninotomy, left L3-4 hemilaminectomy, and bilateral foraminotomy  - Dilaudid 0 5 mg q6hrs p r n , oxycodone IR 10 mg q 6hrs p r n , oxycodone IR tablet 5 mg q 6hrs p r n  for pain  - q 4 hr neuro checks  - holding DVT prophylaxis for surgery    Opioid dependence  PDMP reviewed yesterday, has been prescribed oxycodone from multiple prescribers most recent being 07/04/2019  Patient is not forthcoming about his opioid use but did admit to getting these meds upon further questioning   - continue oxycodone and Dilaudid p r n  with caution  COPD  Chronic issue, patient is not currently in exacerbation  He is saturating well on room air   - albuterol inhaler p r n  Tobacco abuse  The patient is a 50 pack-year smoking history  Currently smoking half pack a day  - nicotine patch 14 mg         Disposition: To the OR today, pending PT eval postop       Subjective:   Patient seen and evaluated bedside today    He continues to endorse severe pain in his back, groin and left thigh   He denies any headache, difficulty with urination, incontinence, constipation, and diarrhea  Vitals: Temp (24hrs), Av 9 °F (36 6 °C), Min:97 8 °F (36 6 °C), Max:97 9 °F (36 6 °C)  Current: Temperature: 97 8 °F (36 6 °C)  Vitals:    19 2019 19 0027 19 0041 19 0700   BP:  95/50 100/60 121/58   BP Location:  Right arm Left arm Right arm   Pulse:  77  60   Resp:  17  18   Temp:  97 9 °F (36 6 °C)  97 8 °F (36 6 °C)   TempSrc:  Oral  Oral   SpO2:  96%  96%   Weight:       Height: 5' 11" (1 803 m)       Body mass index is 25 52 kg/m²  I/O last 24 hours: In: 1025 [I V :975; IV Piggyback:50]  Out: 600 [Urine:600]      Physical Exam:   Physical Exam   Constitutional: He is oriented to person, place, and time  He appears well-developed and well-nourished  No distress  HENT:   Head: Normocephalic and atraumatic  Cardiovascular: Normal rate, regular rhythm and normal heart sounds  Exam reveals no friction rub  No murmur heard  Pulmonary/Chest: Effort normal and breath sounds normal  No stridor  No respiratory distress  He has no wheezes  He has no rales  Abdominal: Soft  Bowel sounds are normal  He exhibits no distension and no mass  There is no tenderness  There is no rebound and no guarding  Genitourinary:   Genitourinary Comments: deferred   Musculoskeletal: Normal range of motion  He exhibits no edema or deformity  Neurological: He is alert and oriented to person, place, and time  No cranial nerve deficit  LLE 3+/5 strength flexion, limited by pain, 5/5 extension,   RLE 5/5 flexion and extension   Skin: Skin is warm and dry  No rash noted  He is not diaphoretic  No erythema  Psychiatric: He has a normal mood and affect  His behavior is normal  Judgment and thought content normal    Vitals reviewed        Invasive Devices     Peripheral Intravenous Line            Peripheral IV 19 Right Forearm less than 1 day                          Labs:   Recent Results (from the past 24 hour(s))   Type and screen    Collection Time: 07/11/19  5:02 PM   Result Value Ref Range    ABO Grouping A     Rh Factor Positive     Antibody Screen Negative     Specimen Expiration Date [id number]    APTT    Collection Time: 07/11/19  5:02 PM   Result Value Ref Range    PTT 28 23 - 37 seconds   Protime-INR    Collection Time: 07/11/19  5:02 PM   Result Value Ref Range    Protime 14 7 (H) 11 6 - 14 5 seconds    INR 1 19 0 84 - 1 19   Urinalysis with reflex to microscopic    Collection Time: 07/11/19  7:55 PM   Result Value Ref Range    Color, UA Dk Yellow     Clarity, UA Clear     Specific Gravity, UA 1 023 1 003 - 1 030    pH, UA 6 0 4 5, 5 0, 5 5, 6 0, 6 5, 7 0, 7 5, 8 0    Leukocytes, UA Negative Negative    Nitrite, UA Negative Negative    Protein, UA Negative Negative mg/dl    Glucose, UA Negative Negative mg/dl    Ketones, UA 15 (1+) (A) Negative mg/dl    Urobilinogen, UA 1 0 0 2, 1 0 E U /dl E U /dl    Bilirubin, UA Interference- unable to analyze (A) Negative    Blood, UA Negative Negative   Comprehensive metabolic panel    Collection Time: 07/12/19  4:56 AM   Result Value Ref Range    Sodium 142 136 - 145 mmol/L    Potassium 3 8 3 5 - 5 3 mmol/L    Chloride 113 (H) 100 - 108 mmol/L    CO2 25 21 - 32 mmol/L    ANION GAP 4 4 - 13 mmol/L    BUN 18 5 - 25 mg/dL    Creatinine 0 58 (L) 0 60 - 1 30 mg/dL    Glucose 83 65 - 140 mg/dL    Calcium 7 8 (L) 8 3 - 10 1 mg/dL    AST 79 (H) 5 - 45 U/L    ALT 75 12 - 78 U/L    Alkaline Phosphatase 120 (H) 46 - 116 U/L    Total Protein 5 9 (L) 6 4 - 8 2 g/dL    Albumin 2 8 (L) 3 5 - 5 0 g/dL    Total Bilirubin 0 20 0 20 - 1 00 mg/dL    eGFR 108 ml/min/1 73sq m   CBC (With Platelets)    Collection Time: 07/12/19  4:56 AM   Result Value Ref Range    WBC 4 52 4 31 - 10 16 Thousand/uL    RBC 3 69 (L) 3 88 - 5 62 Million/uL    Hemoglobin 11 8 (L) 12 0 - 17 0 g/dL    Hematocrit 37 3 36 5 - 49 3 %     (H) 82 - 98 fL    MCH 32 0 26 8 - 34 3 pg MCHC 31 6 31 4 - 37 4 g/dL    RDW 14 6 11 6 - 15 1 %    Platelets 768 077 - 332 Thousands/uL    MPV 10 1 8 9 - 12 7 fL   APTT    Collection Time: 07/12/19  4:56 AM   Result Value Ref Range    PTT 28 23 - 37 seconds   Protime-INR    Collection Time: 07/12/19  4:56 AM   Result Value Ref Range    Protime 14 3 11 6 - 14 5 seconds    INR 1 15 0 84 - 1 19       Radiology Results: I have personally reviewed pertinent films in PACS      Other Diagnostic Testing:   I have personally reviewed pertinent films in PACS      Active Meds:   Current Facility-Administered Medications   Medication Dose Route Frequency    acetaminophen (TYLENOL) tablet 650 mg  650 mg Oral Q6H PRN    albuterol (PROVENTIL HFA,VENTOLIN HFA) inhaler 1 puff  1 puff Inhalation Q4H PRN    chlorhexidine (PERIDEX) 0 12 % oral rinse 15 mL  15 mL Swish & Spit Q12H [facility]    DULoxetine (CYMBALTA) delayed release capsule 60 mg  60 mg Oral Daily    HYDROmorphone (DILAUDID) injection 0 5 mg  0 5 mg Intravenous Q6H PRN    nicotine (NICODERM CQ) 14 mg/24hr TD 24 hr patch 1 patch  1 patch Transdermal Daily    oxyCODONE (ROXICODONE) immediate release tablet 10 mg  10 mg Oral Q6H PRN    oxyCODONE (ROXICODONE) IR tablet 5 mg  5 mg Oral Q6H PRN    senna-docusate sodium (SENOKOT S) 8 6-50 mg per tablet 1 tablet  1 tablet Oral HS    sodium chloride 0 9 % infusion  125 mL/hr Intravenous Continuous    vancomycin (VANCOCIN) IVPB (premix) 1,000 mg  1,000 mg Intravenous Once         VTE Pharmacologic Prophylaxis: RX contraindicated due to:  pre op  VTE Mechanical Prophylaxis: sequential compression device    Abdirahman Weiner MD  Internal Medicine  PGY1  7/12/2019 9:02 AM

## 2019-07-12 NOTE — PROGRESS NOTES
Plan:    Left L1/2 microdiskectomy  Left L2/3 foramintomy  Left L3/4 hemilaminectomy and bilateral foraminotomy    The risks, benefits and complications of surgery were explained in detail to [employer]  including hemorrhage, infection, CSF leakage, wound problems, pain, weakness, numbness, dysesthesiae, paralysis, coma, and death  Also, the possibility  of further surgery being required was emphasized  Other potential medical complications were outlined, including deep venous thrombosis, pulmonary embolism, pneumonia, urinary tract infection, myocardial infarction,  and stroke  The need for physical therapy, occupational therapy, and rehabilitation was also mentioned  The alternatives to surgery were discussed  [employer] asked relevant questions and asked that we proceed with arrangements for surgery

## 2019-07-12 NOTE — UTILIZATION REVIEW
Initial Clinical Review    Admission: Date/Time/Statement: 7/11/19 @ 1717     Orders Placed This Encounter   Procedures    Inpatient Admission     Standing Status:   Standing     Number of Occurrences:   1     Order Specific Question:   Admitting Physician     Answer:   Chely Rush [[id number]]     Order Specific Question:   Level of Care     Answer:   Med Surg [[id number]]     Order Specific Question:   Estimated length of stay     Answer:   More than 2 Midnights     Order Specific Question:   Certification     Answer:   I certify that inpatient services are medically necessary for this patient for a duration of greater than two midnights  See H&P and MD Progress Notes for additional information about the patient's course of treatment  ED Arrival Information     Expected Arrival Acuity Means of Arrival Escorted By Service Admission Type    - 7/11/2019 12:34 Urgent Wheelchair Self General Medicine Urgent    Arrival Complaint    -        Chief Complaint   Patient presents with    Back Pain     Pt c/o left sided low back pain and left leg pain  Pt states was admitted 2 weeks ago for same  Pt states pain is so bad he can not take it  Assessment/Plan:   Erica Caldera a 58 y  o  male complaining of mid-back pain with radiation to the left thigh and into the knee, as well as the testicle  PMH is significant for ongoing LBP and COPD  He says the pain became very severe 2 weeks ago and that he has not slept in 3 days due to his pain  He says he has not been able to sit still or find a comfortable position  This is associated with leg weakness, numbness, tingling, and decreased sensation  It is also associated with muscle spasms in the lumbar region of his back  He rates the pain as an 8/10 on presentation, but says it is about a 5/10 after dilaudid was administered  He denies any constipation, diarrhea, or urinary incontinence   He says he only takes gabapentin 27mg and cymbalta for his back pain, but says they haven't been helping  According to the [address] he has been prescribed oxycodone from a few prescribers, the most recent being 19  He endorses 4 prior back surgeries in the L4-5 region  He currently lives alone in a 1st floor apartment, but does need to walk up 3 steps  He has a brother, sister, and nieces and nephews in the area  His father  at age 39 of an MI  He has significant smoking history of 50 years, still a current smoker but down from 2 ppd to 0 5 ppd as of the past 6 months  He also says he sometimes smokes marijuana but denies any IVDU  He did take methadone for his "back pain" 10 years ago, stopped abruptly and experienced withdrawal symptoms  He denies consistent narcotic use since then  He denies any shortness of breath or chest pain  1  Chronic left-sided lower back pain without sciatica   -acute on chronic back pain with left radicular symptoms, 2 knee  He had scheduled neurosurgery office visit tomorrow regarding possible surgical intervention but given worsening pain came to ER today  -MRI lumbar spine without contrast 2019:Anterior fusion L4-5 and L5-S1   Limited post contrast imaging of the lumbar spine is suggested to evaluate proximal left L5 root, and bilateral spinal stenosis disc to radiculitis   Mild T11-12 related to facet arthropathy and right synovial facet cyst   Cord is not compressed   -hold DVT prophylaxis due to surgical intervention tomorrow  -mobilize as tolerated with assistance, PT/OT  -patient control, oxycodone 5 mg every 6 hours p r n  For moderate pain, oxycodone 10 mg every 6 hours p r n  For severe pain, Dilaudid 0 5 mg every 6 hours p r n  For breakthrough pain  -will undergo surgical intervention tomorrow - left L1-2 diskectomy, left L2-3 foraminotomy, left L3-4 hemilaminectomy and bilateral foraminotomy  -NPO after midnight     2   COPD without exacerbation  -patient is currently not in exacerbation and denies any shortness of breath, will continue inhaler regimen p r n      3   Tobacco abuse  -nicotine 14 mg/24 hours patch     4  Opioid dependence  -PD and P checked, patient is getting 5 mg of oxycodone outpatient     Code Status:  Level 3  VTE Pharmacologic Prophylaxis:  Hold for surgery tomorrow  VTE Mechanical Prophylaxis: sequential compression device  __________________________________  7/13 OPERATIVE REPORT  Preop Diagnosis:  Spinal stenosis of lumbar region, unspecified whether neurogenic claudication present [M48 061]  Chronic left-sided low back pain without sciatica [M54 5, G89 29]     Post-Op Diagnosis Codes:     * Spinal stenosis of lumbar region, unspecified whether neurogenic claudication present [M48 061]     * Chronic left-sided low back pain without sciatica [M54 5, G89 29]     Procedure(s) (LRB):  Metrx minimally invasive left L1-2 discectomy, left L2-3 foraminotomy, left L3-4 hemilaminectomy and bilateral foramintomy (N/A)     Anesthesia Type:   General     Operative Findings:  Tight stenosis at L3-4 with a subligamentous disc herniation at L1-2  _________________________________    ED Triage Vitals   Temperature Pulse Respirations Blood Pressure SpO2   07/12/19 0027 07/11/19 1243 07/11/19 1243 07/11/19 1243 07/11/19 1243   97 9 °F (36 6 °C) 104 18 140/89 97 %      Temp Source Heart Rate Source Patient Position - Orthostatic VS BP Location FiO2 (%)   07/12/19 0027 07/11/19 1243 07/11/19 1243 07/11/19 1243 --   Oral Monitor Sitting Left arm       Pain Score       07/11/19 1243       8        Wt Readings from Last 1 Encounters:   07/11/19 83 kg (182 lb 15 7 oz)     Additional Vital Signs:   07/12/19 1515    64  14  116/65  95 %  Nasal cannula   07/12/19 1500  97 4 °F (36 3 °C)Abnormal   64  13  111/63  94 %  Nasal cannula   07/12/19 1445    64  15  99/53  93 %  None (Room air)   07/12/19 1430    70  15  83/44Abnormal   94 %  None (Room air)   07/12/19 1415    70  12  109/53  100 %  Simple mask   07/12/19 1412  97 7 °F (36 5 °C)  70  16  103/58 100 %  Simple mask   07/12/19 1000  98 2 °F (36 8 °C)             07/12/19 0700  97 8 °F (36 6 °C)  60  18  121/58  96 %     07/12/19 0041        100/60       07/12/19 0027  97 9 °F (36 6 °C)  77  17  95/50  96 %  None (Room air)   07/11/19 1619    78  16  122/68  99 %  None (Room air)   07/11/19 1450    98  16  133/78  98 %  None (Room air)     Pertinent Labs/Diagnostic Test Results:     7/6 MRI L SPINE - Anterior fusion L4-L5 and L5-S1  Limited postcontrast imaging of the lumbar spine is suggested to evaluate proximal left L5 root, at the L4-5 disc space  Severe canal, left foraminal, and bilateral lateral recess stenosis at the L3-L4 level  Correlate for signs and symptoms of spinal stenosis, left L3 and bilateral L4 radiculitis  Descending extruded disc to the left at the L1-L2 level, correlate for left L2 radiculitis  Mild canal stenosis T11-T12 related to facet arthropathy and right synovial facet cyst   Cord is not compressed      Results from last 7 days   Lab Units 07/12/19  0456   WBC Thousand/uL 4 52   HEMOGLOBIN g/dL 11 8*   HEMATOCRIT % 37 3   PLATELETS Thousands/uL 171     Results from last 7 days   Lab Units 07/12/19  0456   SODIUM mmol/L 142   POTASSIUM mmol/L 3 8   CHLORIDE mmol/L 113*   CO2 mmol/L 25   ANION GAP mmol/L 4   BUN mg/dL 18   CREATININE mg/dL 0 58*   EGFR ml/min/1 73sq m 108   CALCIUM mg/dL 7 8*     Results from last 7 days   Lab Units 07/12/19  0456   AST U/L 79*   ALT U/L 75   ALK PHOS U/L 120*   TOTAL PROTEIN g/dL 5 9*   ALBUMIN g/dL 2 8*   TOTAL BILIRUBIN mg/dL 0 20     Results from last 7 days   Lab Units 07/12/19  0456   GLUCOSE RANDOM mg/dL 83     Results from last 7 days   Lab Units 07/12/19  0456 07/11/19  1702   PROTIME seconds 14 3 14 7*   INR  1 15 1 19   PTT seconds 28 28     Results from last 7 days   Lab Units 07/11/19  1955   CLARITY UA  Clear   COLOR UA  Dk Yellow   SPEC GRAV UA  1 023   PH UA  6 0   GLUCOSE UA mg/dl Negative   KETONES UA mg/dl 15 (1+)*   BLOOD UA  Negative   PROTEIN UA mg/dl Negative   NITRITE UA  Negative   BILIRUBIN UA  Interference- unable to analyze*   UROBILINOGEN UA E U /dl 1 0   LEUKOCYTES UA  Negative     ED Treatment:   Medication Administration from 07/11/2019 1234 to 07/11/2019 1852    Date/Time Order Dose Route Action   07/11/2019 1507 HYDROmorphone (DILAUDID) injection 1 mg 1 mg Intravenous Given   07/11/2019 1507 ondansetron (ZOFRAN) injection 4 mg 4 mg Intravenous Given   07/11/2019 1729 desmopressin (DDAVP) 24 8 mcg in sodium chloride 0 9 % 50 mL IVPB 24 8 mcg Intravenous New Bag        Past Medical History:   Diagnosis Date    Chronic back pain     [facility] obstructive [facility])     Lumbar herniated disc      Present on Admission:   Chronic left-sided low back pain without sciatica   Leg pain   Tobacco abuse   [facility]ch[facility] obstructive [facility]) [facility])   Spinal stenosis   Opioid dependence ([facility])    Admitting Diagnosis: Back pain [M54 9]  Radiculopathy [M54 10]  Acute midline low back pain with left-sided sciatica [M54 42]  Chronic left-sided low back pain without sciatica [M54 5, G89 29]  Spinal stenosis of lumbar region, unspecified whether neurogenic claudication present [M48 061]     Age/Sex: 58 y o  male     Admission Orders:    Current Facility-Administered Medications:  [MAR Hold] acetaminophen 650 mg Oral Q6H PRN     albuterol 1 puff Inhalation Q4H PRN     chlorhexidine 15 mL Swish & Spit Q12H [facility]     diphenhydrAMINE 12 5 mg Intravenous Once PRN     DULoxetine 60 mg Oral Daily     fentaNYL 25 mcg Intravenous Q3 min PRN     fentaNYL 50 mcg Intravenous Q5 Min PRN     HYDROmorphone 0 2 mg Intravenous Q5 Min PRN     HYDROmorphone 0 2 mg Intravenous Q5 Min PRN     [MAR Hold] HYDROmorphone 0 5 mg Intravenous Q6H PRN  X1 7/11, X1 7/12   HYDROmorphone 0 5 mg Intravenous Q5 Min PRN     metoclopramide 10 mg Intravenous Once PRN     [MAR Hold] nicotine 1 patch Transdermal Daily ondansetron 4 mg Intravenous Once PRN     ondansetron 4 mg Intravenous Once PRN     [MAR Hold] oxyCODONE 10 mg Oral Q6H PRN  X1 7/11, X2 7/12   [MAR Hold] oxyCODONE 5 mg Oral Q6H PRN     [MAR Hold] senna-docusate sodium 1 tablet Oral HS     sodium chloride 125 mL/hr Intravenous Continuous Last Rate: Stopped (07/12/19 1400)    vancomycin 1,000 mg Intravenous Once       SCDs  NPO FOR OR 7/12  NEURO CHECKS Q 4 HR   CONT PULSE OX   POC GLUCOSE AC/HS WITH [facility]SI COVERAGE   IP CONSULT TO NEUROSURGERY  PT/OT EVAL/TX     Network Utilization Review Department  Phone: [phone]; Fax [phone]  [email or web address]  ATTENTION: Please call with any questions or concerns to [phone]  and carefully listen to the prompts so that you are directed to the right person  Send all requests for admission clinical reviews, approved or denied determinations and any other requests to fax [phone]   All voicemails are confidential

## 2019-07-12 NOTE — OP NOTE
OPERATIVE REPORT  PATIENT NAME: Anna Oswald    :  1955  MRN: [id number]  Pt Location: [facility] OR ROOM 17    SURGERY DATE: 2019    Surgeon(s) and Role:     * Jordan Delgado MD - Primary    Preop Diagnosis:  Spinal stenosis of lumbar region, unspecified whether neurogenic claudication present [M48 061]  Chronic left-sided low back pain without sciatica [M54 5, G89 29]    Post-Op Diagnosis Codes:     * Spinal stenosis of lumbar region, unspecified whether neurogenic claudication present [M48 061]     * Chronic left-sided low back pain without sciatica [M54 5, G89 29]    Procedure(s) (LRB):  Metrx minimally invasive left L1-2 discectomy, left L2-3 foraminotomy, left L3-4 hemilaminectomy and bilateral foramintomy (N/A)    Specimen(s):  * No specimens in log *    Estimated Blood Loss:   Minimal    Drains:  * No LDAs found *    Anesthesia Type:   General    Operative Indications:  Spinal stenosis of lumbar region, unspecified whether neurogenic claudication present [M48 061]  Chronic left-sided low back pain without sciatica [M54 5, G89 29]      Operative Findings:  Tight stenosis at L3-4 with a subligamentous disc herniation at L9-8    Complications:   None    Procedure and Technique:  After adequate general endotracheal anesthesia the patient was placed prone on the operating room table  The back was prepped with Betadine soap and then DuraPrep double layer drapes placed normal fashion and 3 and Betadine impregnated sticky drape was placed over these  A timeout was called all parameters the timeout were followed  The K wire was placed to the facet overlying the L3-4 interspace on the left side  Intraoperative fluoroscopy was used throughout the case to either identification of level  There was radiology over-read for level verification  The skin was then infiltrated with 1% lidocaine with 1 100,000 epinephrine  A #15 blade was used to incise the skin   Bovie and bipolar were used throughout the procedure to maintain operative exposure  Intraoperative microscope was used at various degrees of magnification to aid in the Identification, illumination, and microdissection necessary to perform this procedure  Progressive dilators were placed, the sheath was placed and this was affixed to the bedside connector  The Midas Rony drill was utilized to perform a hemilaminotomy  A medial facetectomy and decompressive foraminotomy were performed with the use of Kerrison rongeurs, foraminotomy rongeurs and curved curettes  A complete decompressive foraminotomy was performed on the bilaterally utilizing the foraminotomy rongeurs and Kerrison rongeurs  Copious quantities of irrigation were used to cleanse out the region  There were no CSF leaks  Next, through the same incision the K wire was placed to the facet overlying the L2-3 interspace on the left side  Intraoperative fluoroscopy was used throughout the case to either identification of level  There was radiology over-read for level verification  Allyne Rm and bipolar were used throughout the procedure to maintain operative exposure  Progressive dilators were placed, the sheath was placed and this was affixed to the bedside connector  The Midas Rony drill was utilized to perform a hemilaminotomy  A medial facetectomy and decompressive foraminotomy were performed with the use of Kerrison rongeurs, foraminotomy rongeurs and curved curettes  A complete decompressive foraminotomy was performed on the bilaterally utilizing the foraminotomy rongeurs and Kerrison rongeurs  Copious quantities of irrigation were used to cleanse out the region  There were no CSF leaks  40 mg of Depo-Medrol was placed a small piece of Gelfoam was placed over this  This Depo-Medrol was exposed both to the L2-3 and L3-4 levels as the hemilaminectomy was connected between the 2 levels  Next, the K wire was placed to the facet overlying the L1-2 interspace on the left side  Intraoperative fluoroscopy was used throughout the case to either identification of level  There was radiology over-read for level verification  The skin was then infiltrated with 1% lidocaine with 1 100,000 epinephrine  A #15 blade was used to incise the skin  Bovie and bipolar were used throughout the procedure to maintain operative exposure  Progressive dilators were placed, the sheath was placed and this was affixed to the bedside connector  The Midas Rony drill was utilized to perform a hemilaminotomy on the left  A large disc herniation was identified in the subligamentous position which was multi fragmented  The disk space was not entered  There were no CSF leaks  40 mg of Depo-Medrol was placed a small piece of Gelfoam was placed over this  The retractors were removed  The fascia was approximated with interrupted 3-0 Vicryl suture  The skin was closed with interrupted inverted 3-0 Vicryl suture  0 5% bupivacaine with 1: 200,000 epinephrine was infiltrated into the surrounding tissues  Benzoin and Steri-Strips are placed and sterile dressing was placed    The patient was taken to the recovery room having tolerated procedure well   I was present for the entire procedure    Patient Disposition:  PACU     SIGNATURE: Ashley Farley MD  DATE: July 12, 2019  TIME: 2:08 PM

## 2019-07-12 NOTE — ANESTHESIA PREPROCEDURE EVALUATION
Review of Systems/Medical History  Patient summary reviewed  Chart reviewed  No history of anesthetic complications     Cardiovascular  Negative cardio ROS    Pulmonary  Smoker cigarette smoker  , Tobacco cessation counseling given , COPD ,        GI/Hepatic  Negative GI/hepatic ROS          Negative  ROS        Endo/Other  Negative endo/other ROS      GYN       Hematology  Negative hematology ROS      Musculoskeletal  Back pain , lumbar pain and spinal stenosis, Sciatica,        Neurology    Motor deficit , left lower extremity weakness,    Psychology     Chronic opioid dependence Chronic pain,   Comment: Patient takes oxycodone PRN and gabapentin 900mg q8h         Physical Exam    Airway    Mallampati score: II  TM Distance: >3 FB  Neck ROM: full     Dental   No notable dental hx     Cardiovascular  Comment: Negative ROS, Rhythm: regular, Rate: normal, Cardiovascular exam normal    Pulmonary  Pulmonary exam normal Breath sounds clear to auscultation,     Other Findings        Anesthesia Plan  ASA Score- 3     Anesthesia Type- general with ASA Monitors  Additional Monitors: arterial line  Airway Plan: ETT  Plan Factors-    Induction- intravenous  Postoperative Plan- Plan for postoperative opioid use  Informed Consent- Anesthetic plan and risks discussed with patient  I personally reviewed this patient with the CRNA  Discussed and agreed on the Anesthesia Plan with the CRNA  Alyse Whiteside           Recent labs personally reviewed:  Lab Results   Component Value Date    WBC 4 52 07/12/2019    HGB 11 8 (L) 07/12/2019     07/12/2019     Lab Results   Component Value Date    K 3 8 07/12/2019    BUN 18 07/12/2019    CREATININE 0 58 (L) 07/12/2019     Lab Results   Component Value Date    PTT 28 07/12/2019      Lab Results   Component Value Date    INR 1 15 07/12/2019       Blood type A    No results found for: Oziel Shirley MD, have personally seen and evaluated the patient prior to anesthetic care  I have reviewed the pre-anesthetic record, and other medical records if appropriate to the anesthetic care  If a CRNA is involved in the case, I have reviewed the CRNA assessment, if present, and agree  Risks/benefits and alternatives discussed with patient including possible PONV, sore throat, and possibility of rare anesthetic and surgical emergencies

## 2019-07-12 NOTE — PHYSICAL THERAPY NOTE
Physical Therapy Cancellation Note    PT ORDERS RECEIVED, CHART REVIEWED  PATIENT SCHEDULED TO GO TO OR TODAY FOR DISKECTOMY PROCEDURE  PT WILL CONTINUE TO FOLLOW AND EVALUATE POST OPERATIVELY AS APPROPRIATE      Jorge Quintero PT, DPT

## 2019-07-13 ENCOUNTER — APPOINTMENT (INPATIENT)
Dept: RADIOLOGY | Facility: HOSPITAL | Age: 64
DRG: 519 | End: 2019-07-13
Payer: MEDICARE

## 2019-07-13 PROCEDURE — G8979 MOBILITY GOAL STATUS: HCPCS

## 2019-07-13 PROCEDURE — 97163 PT EVAL HIGH COMPLEX 45 MIN: CPT

## 2019-07-13 PROCEDURE — G8980 MOBILITY D/C STATUS: HCPCS

## 2019-07-13 PROCEDURE — G8988 SELF CARE GOAL STATUS: HCPCS

## 2019-07-13 PROCEDURE — G8989 SELF CARE D/C STATUS: HCPCS

## 2019-07-13 PROCEDURE — G8987 SELF CARE CURRENT STATUS: HCPCS

## 2019-07-13 PROCEDURE — 99024 POSTOP FOLLOW-UP VISIT: CPT | Performed by: PHYSICIAN ASSISTANT

## 2019-07-13 PROCEDURE — G8978 MOBILITY CURRENT STATUS: HCPCS

## 2019-07-13 PROCEDURE — 72100 X-RAY EXAM L-S SPINE 2/3 VWS: CPT

## 2019-07-13 PROCEDURE — 97166 OT EVAL MOD COMPLEX 45 MIN: CPT

## 2019-07-13 RX ADMIN — OXYCODONE HYDROCHLORIDE 5 MG: 5 TABLET ORAL at 17:21

## 2019-07-13 RX ADMIN — OXYCODONE HYDROCHLORIDE 10 MG: 10 TABLET ORAL at 02:24

## 2019-07-13 RX ADMIN — HYDROMORPHONE HYDROCHLORIDE 0.5 MG: 1 INJECTION, SOLUTION INTRAMUSCULAR; INTRAVENOUS; SUBCUTANEOUS at 11:11

## 2019-07-13 RX ADMIN — OXYCODONE HYDROCHLORIDE 5 MG: 5 TABLET ORAL at 09:27

## 2019-07-13 RX ADMIN — DULOXETINE HYDROCHLORIDE 60 MG: 60 CAPSULE, DELAYED RELEASE ORAL at 11:11

## 2019-07-13 RX ADMIN — HYDROMORPHONE HYDROCHLORIDE 0.5 MG: 1 INJECTION, SOLUTION INTRAMUSCULAR; INTRAVENOUS; SUBCUTANEOUS at 07:59

## 2019-07-13 RX ADMIN — DIAZEPAM 5 MG: 5 TABLET ORAL at 20:02

## 2019-07-13 RX ADMIN — DOCUSATE SODIUM 100 MG: 100 CAPSULE, LIQUID FILLED ORAL at 08:04

## 2019-07-13 RX ADMIN — OXYCODONE HYDROCHLORIDE 10 MG: 10 TABLET ORAL at 06:31

## 2019-07-13 RX ADMIN — DOCUSATE SODIUM 100 MG: 100 CAPSULE, LIQUID FILLED ORAL at 17:17

## 2019-07-13 RX ADMIN — HYDROMORPHONE HYDROCHLORIDE 0.5 MG: 1 INJECTION, SOLUTION INTRAMUSCULAR; INTRAVENOUS; SUBCUTANEOUS at 20:02

## 2019-07-13 RX ADMIN — HEPARIN SODIUM 5000 UNITS: 5000 INJECTION INTRAVENOUS; SUBCUTANEOUS at 06:26

## 2019-07-13 RX ADMIN — HYDROMORPHONE HYDROCHLORIDE 0.5 MG: 1 INJECTION, SOLUTION INTRAMUSCULAR; INTRAVENOUS; SUBCUTANEOUS at 04:03

## 2019-07-13 RX ADMIN — DIAZEPAM 5 MG: 5 TABLET ORAL at 09:27

## 2019-07-13 RX ADMIN — NICOTINE 1 PATCH: 14 PATCH TRANSDERMAL at 08:04

## 2019-07-13 RX ADMIN — HYDROMORPHONE HYDROCHLORIDE 0.5 MG: 1 INJECTION, SOLUTION INTRAMUSCULAR; INTRAVENOUS; SUBCUTANEOUS at 15:11

## 2019-07-13 RX ADMIN — METHOCARBAMOL 500 MG: 500 TABLET, FILM COATED ORAL at 02:24

## 2019-07-13 RX ADMIN — OXYCODONE HYDROCHLORIDE 10 MG: 10 TABLET ORAL at 13:31

## 2019-07-13 NOTE — PHYSICAL THERAPY NOTE
Physical Therapy Evaluation     Patient's Name: Jake Sherwood    Admitting Diagnosis  Back pain [M54 9]  Radiculopathy [M54 10]  Acute midline low back pain with left-sided sciatica [M54 42]  Chronic left-sided low back pain without sciatica [M54 5, G89 29]  Spinal stenosis of lumbar region, unspecified whether neurogenic claudication present [M48 061]    Problem List  Patient Active Problem List   Diagnosis    Leg pain    History of resection of meningioma    Trochanteric bursitis    Tobacco abuse    Chronic left-sided low back pain without sciatica    [facility] disease) [facility] [address] [facility]    Spinal stenosis    Opioid dependence ([facility] [address] )       Past Medical History  Past Medical History:   Diagnosis Date    Chronic back pain     COPD (chronic obstructive pulmonary disease) ([facility] [address] [facility]     Lumbar herniated disc        Past Surgical History  Past Surgical History:   Procedure Laterality Date    BACK SURGERY      NECK SURGERY      SHOULDER SURGERY      SPINE SURGERY          07/13/19 1415   Note Type   Note type Eval only   Pain Assessment   Pain Assessment 0-10   Pain Score 6   Pain Type Acute pain;Surgical pain   Pain Location Back   Patient's Stated Pain Goal No pain   Hospital Pain Intervention(s) Repositioned; Ambulation/increased activity   Home Living   Type of Home House  (4 ALONSO)   Home Layout One level;Performs ADLs on one level;Stairs to enter with rails   [address] [facility]  (Pt reports primarily independent without SPC PTA)   Prior Function   Level of [place] Independent with ADLs and functional mobility   Lives With Alone   Receives Help From Family   ADL Assistance Independent   IADLs Independent   Falls in the last 6 months 0   Restrictions/Precautions   Weight Bearing Precautions Per Order No   Other Precautions Pain; Fall Risk;Spinal precautions   General   Family/Caregiver Present No   Cognition   Overall Cognitive Status [facility]   Arousal/Participation Cooperative   Orientation Level Oriented X4   Memory Within functional limits   Following Commands Follows all commands and directions without difficulty   RLE Assessment   RLE Assessment WFL   LLE Assessment   LLE Assessment WFL   Light Touch   RLE Light Touch Grossly intact   LLE Light Touch Grossly intact   Bed Mobility   Supine to Sit 7  Independent   Additional items Increased time required   Transfers   Sit to Stand 5  Supervision   Additional items Increased time required;Verbal cues   Stand to Sit 6  Modified independent   Additional items Increased time required   Ambulation/Elevation   Gait pattern Antalgic;Narrow DOROTHY; Short stride; Step to;Excessively slow   Gait Assistance 6  Modified independent  (Progressed supervision to modified independent in session)   Assistive Device Rolling walker   Distance 100 ft with RW, 10 ft without AD, 30 ft with SPC, 60 ft with RW   Stair Management Assistance 6  Modified independent   Additional items Verbal cues   Stair Management Technique One rail L   Number of Stairs 8   Balance   Static Sitting Good   Dynamic Sitting Fair +   Static Standing Fair +   Dynamic Standing Fair   Ambulatory Fair -   Endurance Deficit   Endurance Deficit Yes   Endurance Deficit Description Pain   Activity Tolerance   Activity Tolerance Patient limited by pain   Medical Staff Made Aware OT, CM   Nurse Made Aware Yes   Assessment   Prognosis Good   Problem List Pain;Decreased endurance   Assessment Pt is a 59 yo male presenting s/p Metric MIS left L1-L2 diskectomy, left L2-L3 foraminotomy, left L3-L4 hemilaminectomy and bilateral foraminotomy on 7/12/19  Pt  has a past medical history of Chronic back pain, COPD (chronic obstructive pulmonary disease) ([address] ), and Lumbar herniated disc  Pt is supine at start of session and agreeable to therapy  Pt transferred supine to sit independently  Pt hesitant to use RW, educated on benefits of RW post operatively  Pt transferred sit <> stand with supervision and RW   Pt ambulated 100 ft with supervision and RW  Pt navigated x8 steps with modified independence and use of railing on the left  Pt requests ambulation trial without AD  Pt ambulated 10 ft without AD, requiring Lupe and presenting with increased lateral sway, slowed jozef and poor dynamic balance  Pt ambulated 30 ft with SPC and supervision, presenting with improved dynamic balance but slowed jozef and narrow DOROTHY  Pt educated at length on importance of utilizing proper AD upon D/C, pt demonstrates understanding of indications for RW  Pt ambulated 60 ft back to room with RW, progressing from supervision to modified independence  Pt remained seated in bedside chair with all needs within reach at end of session  Pt is safe to D/C home with family support and RW   PT to sign off at this time, please re-consult if medically necessary   Barriers to Discharge None   Goals   Patient Goals To get better fast   Treatment Day 0   Plan   Treatment/Interventions   (D/C PT)   PT Frequency   (D/C PT)   Recommendation   Recommendation Home with family support   Equipment Recommended Walker  (CM notified)   PT - OK to Discharge Yes   Modified [place] Scale   Modified [place] Scale 2   Barthel Index   Feeding 10   Bathing 0   Grooming Score 5   Dressing Score 5   Bladder Score 10   Bowels Score 10   Toilet Use Score 10   Transfers (Bed/Chair) Score 10   Mobility (Level Surface) Score 15   Stairs Score 10   Barthel Index Score 85         Inga Parry, PT, DPT

## 2019-07-13 NOTE — PROGRESS NOTES
Progress Note - Neurosurgery   Steven Goldstein 58 y o  male MRN: [id number]  Unit/Bed#: -01 Encounter: [id number]    Assessment:  1  POD #1Metric MIS left L1-L2 diskectomy, left L2-L3 foraminotomy, left L3-L4 hemilaminectomy and bilateral foraminotomy  2  Spinal stenosis of the lumbar region with neurogenic claudication  3  Chronic lower back pain  4  COPD    Plan:  -mobilized with PT and OT  -DC IV fluids and daily weight  -pain seems more adequately controlled on current seen  -tentative discharge for Sunday/patient lives by himself but seems to have fair amount of support    Subjective/Objective     No new complaints    Invasive Devices     Peripheral Intravenous Line            Peripheral IV 07/11/19 Right Forearm 1 day                Physical Exam:    Vitals: Blood pressure 106/65, pulse 72, temperature 98 2 °F (36 8 °C), temperature source Oral, resp  rate 18, height 5' 11" (1 803 m), weight 84 3 kg (185 lb 12 5 oz), SpO2 98 %  ,Body mass index is 25 91 kg/m²  Awake and alert  Moves all extremities  Good lower extremity strength  Incision clean dry and intact  Lungs clear bilateral  Heart regular rate  Abdomen soft    Lab Results: I have personally reviewed pertinent results        Imaging Studies: Upright x-rays pending      VTE Pharmacologic Prophylaxis: Heparin    VTE Mechanical Prophylaxis: sequential compression device

## 2019-07-13 NOTE — PLAN OF CARE
Problem: Potential for Falls  Goal: Patient will remain free of falls  Description  INTERVENTIONS:  - Assess patient frequently for physical needs  -  Identify cognitive and physical deficits and behaviors that affect risk of falls    -  [place] fall precautions as indicated by assessment   - Educate patient/family on patient safety including physical limitations  - Instruct patient to call for assistance with activity based on assessment  - Modify environment to reduce risk of injury  - Consider OT/PT consult to assist with strengthening/mobility  Outcome: Progressing     Problem: PAIN - ADULT  Goal: Verbalizes/displays adequate comfort level or baseline comfort level  Description  Interventions:  - Encourage patient to monitor pain and request assistance  - Assess pain using appropriate pain scale  - Administer analgesics based on type and severity of pain and evaluate response  - Implement non-pharmacological measures as appropriate and evaluate response  - Consider cultural and social influences on pain and pain management  - Notify physician/advanced practitioner if interventions unsuccessful or patient reports new pain  Outcome: Progressing     Problem: INFECTION - ADULT  Goal: Absence or prevention of progression during hospitalization  Description  INTERVENTIONS:  - Assess and monitor for signs and symptoms of infection  - Monitor lab/diagnostic results  - Monitor all insertion sites, i e  indwelling lines, tubes, and drains  - Monitor endotracheal (as able) and nasal secretions for changes in amount and color  - [place] appropriate cooling/warming therapies per order  - Administer medications as ordered  - Instruct and encourage patient and family to use good hand hygiene technique  - Identify and instruct in appropriate isolation precautions for identified infection/condition  Outcome: Progressing  Goal: Absence of fever/infection during neutropenic period  Description  INTERVENTIONS:  - Monitor WBC  - Implement neutropenic guidelines  Outcome: Progressing     Problem: SAFETY ADULT  Goal: Maintain or return to baseline ADL function  Description  INTERVENTIONS:  -  Assess patient's ability to carry out ADLs; assess patient's baseline for ADL function and identify physical deficits which impact ability to perform ADLs (bathing, care of mouth/teeth, toileting, grooming, dressing, etc )  - Assess/evaluate cause of self-care deficits   - Assess range of motion  - Assess patient's mobility; develop plan if impaired  - Assess patient's need for assistive devices and provide as appropriate  - Encourage maximum independence but intervene and supervise when necessary  ¯ Involve family in performance of ADLs  ¯ Assess for home care needs following discharge   ¯ Request OT consult to assist with ADL evaluation and planning for discharge  ¯ Provide patient education as appropriate  Outcome: Progressing  Goal: Maintain or return mobility status to optimal level  Description  INTERVENTIONS:  - Assess patient's baseline mobility status (ambulation, transfers, stairs, etc )    - Identify cognitive and physical deficits and behaviors that affect mobility  - Identify mobility aids required to assist with transfers and/or ambulation (gait belt, sit-to-stand, lift, walker, cane, etc )  - [place] fall precautions as indicated by assessment  - Record patient progress and toleration of activity level on Mobility SBAR; progress patient to next Phase/Stage  - Instruct patient to call for assistance with activity based on assessment  - Request Rehabilitation consult to assist with strengthening/weightbearing, etc   Outcome: Progressing     Problem: DISCHARGE PLANNING  Goal: Discharge to home or other facility with appropriate resources  Description  INTERVENTIONS:  - Identify barriers to discharge w/patient and caregiver  - Arrange for needed discharge resources and transportation as appropriate  - Identify discharge learning needs (meds, wound care, etc )  - Arrange for interpretive services to assist at discharge as needed  - Refer to Case Management Department for coordinating discharge planning if the patient needs post-hospital services based on physician/advanced practitioner order or complex needs related to functional status, cognitive ability, or social support system  Outcome: Progressing     Problem: Knowledge Deficit  Goal: Patient/family/caregiver demonstrates understanding of disease process, treatment plan, medications, and discharge instructions  Description  Complete learning assessment and assess knowledge base    Interventions:  - Provide teaching at level of understanding  - Provide teaching via preferred learning methods  Outcome: Progressing

## 2019-07-13 NOTE — OCCUPATIONAL THERAPY NOTE
[address]  Evaluation     Patient Name: Yvette YUSUF Date: 7/13/2019  Problem List  Patient Active Problem List   Diagnosis    Leg pain    History of resection of meningioma    Trochanteric bursitis    Tobacco abuse    Chronic left-sided low back pain without sciatica    COPD (chronic obstructive pulmonary disease) ([facility] [address]    Spinal stenosis    Opioid dependence ([facility] )     Past Medical History  Past Medical History:   Diagnosis Date    Chronic back pain     [facility]     Lumbar herniated disc      Past Surgical History  Past Surgical History:   Procedure Laterality Date    BACK SURGERY      NECK SURGERY      SHOULDER SURGERY      SPINE SURGERY           07/13/19 1400   Note Type   Note type Eval only   Restrictions/Precautions   Other Precautions Pain;Spinal precautions   Pain Assessment   Pain Assessment 0-10   Pain Score 6   Pain Type Acute pain;Chronic pain   Pain Location Back   Hospital Pain Intervention(s) Repositioned; Ambulation/increased activity; Emotional support   Home Living   Type of [address] [facility] One level;Stairs to enter with [facility]   Prior Function   Level of [place] Independent with ADLs and functional mobility   Lives With Alone   Receives Help From Family   ADL Assistance Independent   IADLs Independent   Falls in the last 6 months 0   Vocational Retired   Lifestyle   Autonomy I adls and mobility with occasional use of spc - i iadls - +   Reciprocal Relationships supportive family/friends and neighbors   Service to Others retired   Intrinsic Gratification active pta - enjoys fishing   Subjective   Subjective "i feel better, it still hurts but i can move around"   ADL   Eating Assistance 7  Independent   Grooming Assistance 5  [address] [facility] 5  [address] [facility] 5  [facility] [address] [address]  5  Supervision/Setup LB Dressing Assistance 5  Supervision/Setup   Toileting Assistance  5  Supervision/Setup   Bed Mobility   Supine to Sit 5  Supervision   Transfers   Sit to Stand 5  Supervision   Stand to Sit 5  Supervision   Stand pivot 5  Supervision   Functional Mobility   Functional Mobility 5  Supervision   Additional items Rolling walker   Balance   Static Sitting Good   Dynamic Sitting Fair +   Static Standing Fair   Dynamic Standing Fair -   Ambulatory Fair -   Activity Tolerance   Activity Tolerance Patient limited by fatigue;Patient limited by pain   RUE Assessment   RUE Assessment WFL   LUE Assessment   LUE Assessment WFL   Cognition   Overall Cognitive Status WFL   Assessment   Limitation Decreased endurance;Decreased self-care trans;Decreased high-level ADLs   Prognosis Good   Assessment Pt is a 58 y o  male who was admitted to [facility] on 7/11/2019 with Chronic left-sided low back pain without sciatica s/p MIS L1-2 diskectomy, L L2-3 foraminotomy, L L3-4 hemilaminectomy and B foraminotomy 2* spinal stenosis with neurogenic claudication   Pt's problem list also includes PMH of previous surgery, COPD and chronic back pain, lumbar herniated disc  At baseline pt was completing adls and mobility independently, I iadls  Pt lives alone in 1 story home  Currently pt requires sba for overall ADLS and sba for functional mobility/transfers  Pt currently presents with impairments in the following categories -limited home support and difficulty performing IADLS  activity tolerance, endurance and standing balance/tolerance   These impairments, as well as pt's fatigue, pain, spinal precautions and decreased caregiver support  limit pt's ability to safely engage in all baseline areas of occupation, includingfunctional mobility/transfers, community mobility, laundry , driving, house maintenance, meal prep, cleaning, social participation  and leisure activities -pt reports family, friends and neighbors will assist prn with iadls - reviewed spinal precautions and proper body mechanics in re: to adls and iadl tasks - pt expressed understanding -  From OT standpoint, recommend home with family support upon D/C  No further acute OT needs indicated at this time - Recommend continued oob for meals, ambulation to/from BR, setup for self care tasks and mobility in hallway with nursing/restorative - d/c from caseload with above recommendations   Goals   Patient Goals go home tomorrow    Plan   Treatment Interventions ADL retraining;Functional transfer training; Endurance training;Patient/family training;Equipment evaluation/education; Compensatory technique education; Activityengagement   OT Frequency Eval only   Recommendation   OT Discharge Recommendation Home with family support   OT - OK to Discharge Yes   Barthel Index   Feeding 10   Bathing 0   Grooming Score 5   Dressing Score 5   Bladder Score 10   Bowels Score 10   Toilet Use Score 10   Transfers (Bed/Chair) Score 10   Mobility (Level Surface) Score 10   Stairs Score 5   Barthel Index Score 75     [place], [place]

## 2019-07-14 VITALS
BODY MASS INDEX: 26.01 KG/M2 | SYSTOLIC BLOOD PRESSURE: 124 MMHG | OXYGEN SATURATION: 97 % | RESPIRATION RATE: 18 BRPM | HEART RATE: 74 BPM | HEIGHT: 71 IN | TEMPERATURE: 97.5 F | WEIGHT: 185.78 LBS | DIASTOLIC BLOOD PRESSURE: 83 MMHG

## 2019-07-14 PROCEDURE — NC001 PR NO CHARGE: Performed by: PHYSICIAN ASSISTANT

## 2019-07-14 PROCEDURE — 99024 POSTOP FOLLOW-UP VISIT: CPT | Performed by: PHYSICIAN ASSISTANT

## 2019-07-14 RX ORDER — METHOCARBAMOL 500 MG/1
500 TABLET, FILM COATED ORAL EVERY 6 HOURS PRN
Qty: 30 TABLET | Refills: 0 | Status: SHIPPED | OUTPATIENT
Start: 2019-07-14 | End: 2019-08-22 | Stop reason: ALTCHOICE

## 2019-07-14 RX ORDER — OXYCODONE HYDROCHLORIDE 5 MG/1
5 TABLET ORAL EVERY 4 HOURS PRN
Qty: 30 TABLET | Refills: 0 | Status: SHIPPED | OUTPATIENT
Start: 2019-07-14 | End: 2019-07-24 | Stop reason: SDUPTHER

## 2019-07-14 RX ADMIN — OXYCODONE HYDROCHLORIDE 10 MG: 10 TABLET ORAL at 08:09

## 2019-07-14 RX ADMIN — NICOTINE 1 PATCH: 14 PATCH TRANSDERMAL at 08:10

## 2019-07-14 RX ADMIN — DULOXETINE HYDROCHLORIDE 60 MG: 60 CAPSULE, DELAYED RELEASE ORAL at 08:10

## 2019-07-14 RX ADMIN — DIAZEPAM 5 MG: 5 TABLET ORAL at 09:24

## 2019-07-14 NOTE — NURSING NOTE
reviewed dc instructions including fol up apts, medication schedule, when to call doctor and 911  PIV Dc'd pt does not have questions

## 2019-07-14 NOTE — PROGRESS NOTES
Progress Note - Neurosurgery   Galilea Soto 58 y o  male MRN: [id number]  Unit/Bed#: -01 Encounter: [id number]    Assessment:  1  POD #2 Metric MIS left L1-L2 diskectomy, left L2-L3 foraminotomy, left L3-L4 hemilaminectomy and bilateral foraminotomy  2  Spinal stenosis of the lumbar region with neurogenic claudication  3  Chronic lower back pain  4  COPD      Plan:  -anxious for discharge today  -cleared by PT and OT for home with family support  -postop follow-up with Neurosurgery as scheduled  -discharge home today      Subjective/Objective     Surgical site pain as expected      Intake/Output       07/14/19 0701 - 07/15/19 0700      [id number] Total       Intake    P O   180  -- 180    Total Intake 180 -- 180       Output    Total Output -- -- --       Net I/O     180 -- 180          Invasive Devices     None                 Physical Exam:    Vitals: Blood pressure 124/83, pulse 74, temperature 97 5 °F (36 4 °C), temperature source Oral, resp  rate 18, height 5' 11" (1 803 m), weight 84 3 kg (185 lb 12 5 oz), SpO2 97 %  ,Body mass index is 25 91 kg/m²  Awake and alert  Moves all extremities  Good lower extremity strength  Ambulates in the crocker independently  Incision clean dry and intact  Lungs clear bilateral  Heart regular rate  Abdomen soft    Lab Results: I have personally reviewed pertinent results  Imaging Studies: I have personally reviewed pertinent reports          VTE Pharmacologic Prophylaxis: Heparin    VTE Mechanical Prophylaxis: sequential compression device

## 2019-07-15 ENCOUNTER — TELEPHONE (OUTPATIENT)
Dept: NEUROSURGERY | Facility: CLINIC | Age: 64
End: 2019-07-15

## 2019-07-15 NOTE — TELEPHONE ENCOUNTER
Called Sacha Valente to follow up on patient POD 3  Patient reports that he is doing very well overall and denies any incisional issues or fevers  Patient denies any bleeding, dizziness, fever, redness, significant pain and swelling  He reports mild soreness at the incision site but states that his pre-surgical pain has completely resolved and he is so happy with his results so far  Patient didn't have any post-op visits scheduled so I scheduled them over the phone with patient and advised him to call the office with any further questions or concerns, or if any incisional issues or fevers would arise  Patient received and does understand the discharge instructions  Reviewed incision care with Patient and he has no further questions at this time  Patient was appreciative for the call

## 2019-07-17 ENCOUNTER — HOSPITAL ENCOUNTER (OUTPATIENT)
Dept: RADIOLOGY | Facility: HOSPITAL | Age: 64
Discharge: HOME | End: 2019-07-17
Attending: ORTHOPAEDIC SURGERY

## 2019-07-21 RX ORDER — DIAZEPAM 5 MG/1
5 TABLET ORAL EVERY 12 HOURS PRN
Qty: 8 TABLET | Refills: 0 | Status: SHIPPED | OUTPATIENT
Start: 2019-07-21 | End: 2019-07-31

## 2019-07-21 NOTE — TELEMEDICINE
Patient states he has been experiencing muscle spasms to L-spine surgical site that is inhibiting his sleep  He is POD#9 Metrx MIS left L1-2 discectomy, left L2-L3 foraminotomy, left L3-4 hemilaminectomy and bilateral foraminotomy  States experienced similar post-operatively and Valium was extremely helpful  States currently has Robaxin and Flexeril which are not helping spasms  Sent in rx for Valium 5 mg BID PRN spasm to [facility]  Discussed with patient  Looked patient up on [facility]  Scheduled for POV on Wednesday

## 2019-07-24 ENCOUNTER — CLINICAL SUPPORT (OUTPATIENT)
Dept: NEUROSURGERY | Facility: CLINIC | Age: 64
End: 2019-07-24

## 2019-07-24 VITALS — TEMPERATURE: 98.3 F | DIASTOLIC BLOOD PRESSURE: 78 MMHG | SYSTOLIC BLOOD PRESSURE: 110 MMHG

## 2019-07-24 DIAGNOSIS — Z98.890 POST-OPERATIVE STATE: Primary | ICD-10-CM

## 2019-07-24 DIAGNOSIS — M48.061 SPINAL STENOSIS OF LUMBAR REGION, UNSPECIFIED WHETHER NEUROGENIC CLAUDICATION PRESENT: ICD-10-CM

## 2019-07-24 PROCEDURE — 99024 POSTOP FOLLOW-UP VISIT: CPT

## 2019-07-24 RX ORDER — OXYCODONE HYDROCHLORIDE 5 MG/1
5 TABLET ORAL EVERY 6 HOURS PRN
Qty: 24 TABLET | Refills: 0 | Status: SHIPPED | OUTPATIENT
Start: 2019-07-24 | End: 2019-08-22 | Stop reason: ALTCHOICE

## 2019-07-24 NOTE — PROGRESS NOTES
Post-Op Visit-Neurosurgery    Yvette Benites 58 y o  male MRN: [id number]    Chief Complaint:  Patient presents post: Metrx minimally invasive left L1-2 discectomy, left L2-3 foraminotomy, left L3-4 hemilaminectomy and bilateral foramintomy (N/A Spine Lumbar)    History of Present Illness:  Patient presents for 2 week POV for incision check  Arrived unaccompanied and ambulated well without assistive device  Reports pain is 5-6/10 but is primarily back spasms  He has had no relief with traditional muscle relaxer and has requested a refill of Valium, as well as oxycodone  Elgie Piper he has noticed improvement every day, and his bilateral leg pain has resolved  He is very pleased with the outcome of the surgery  Current Outpatient Medications:     albuterol (VENTOLIN HFA) 90 mcg/act inhaler, ventolin hfa 108 (90 base) mcg/actaers, Disp: , Rfl:     diazepam (VALIUM) 5 mg tablet, Take 1 tablet (5 mg total) by mouth every 12 (twelve) hours as needed for muscle spasms for up to 10 days, Disp: 8 tablet, Rfl: 0    diclofenac (VOLTAREN) 75 mg EC tablet, Take 1 tablet (75 mg total) by mouth 2 (two) times a day as needed (pain) Take with food  , Disp: 60 tablet, Rfl: 1    DULoxetine (CYMBALTA) 60 mg delayed release capsule, Daily, Disp: , Rfl:     lidocaine (LIDODERM) 5 %, Apply 1 patch topically daily Remove & Discard patch within 12 hours or as directed by MD, Disp: 30 patch, Rfl: 0    oxyCODONE (ROXICODONE) 5 mg immediate release tablet, Take 1 tablet (5 mg total) by mouth every 4 (four) hours as needed for moderate pain for up to 10 daysMax Daily Amount: 30 mg, Disp: 30 tablet, Rfl: 0    gabapentin (NEURONTIN) 600 MG tablet, 2 capsules 3 (three) times a day, Disp: , Rfl:     ketorolac (TORADOL) 10 mg tablet, Take 1 tablet (10 mg total) by mouth every 6 (six) hours as needed for moderate pain (Patient not taking: Reported on 7/11/2019), Disp: 30 tablet, Rfl: 2    methocarbamol (ROBAXIN) 500 mg tablet, Take 1 tablet (500 mg total) by mouth every 6 (six) hours as needed for muscle spasms (Patient not taking: Reported on 7/24/2019), Disp: 30 tablet, Rfl: 0   Allergies   Allergen Reactions    Cefadroxil Other (See Comments) and Rash    Codeine Other (See Comments)     "sick to stomach"      Iodinated Diagnostic Agents Anaphylaxis    Sulfa Antibiotics Anaphylaxis and GI Intolerance    Clindamycin Rash          Assessment:    Vitals:    07/24/19 1117   BP: 110/78   BP Location: Left arm   Patient Position: Sitting   Temp: 98 3 °F (36 8 °C)   TempSrc: Tympanic      Pain Score:   5     Wound Exam: Incisions are clean, dry, and in tact, without, heat, swelling, or drainage  Some superficial skin spreading of inferior incision noted  See image below:         Location: Lumbar spine region  Procedure:  Surgical site assessment   Procedure Note: 2 steri strips removed  Complications: None  Incision GEM  Discussion/Summary  Discussed refill of pain medication with LAZ QUINONES who signed order for Oxycodone 5mg Q6H prn pain  A traditional muscle relaxer was offered in place of Valium as we usually do not prescribe this medication after 10 days post op  Patient declined  Reviewed incision care with patient including daily observation for s/s infection including: increased erythema, edema, drainage, dehiscence of incision or fever >101  Should these be observed, he understands that he is to call and/or return immediately for reassessment  Advised patient to continue cleansing area with mild soap and water and pat dry  Not to apply any lotions, creams, or ointments, & not to submerge in any water for two more weeks  He is to maintain activity restrictions until cleared by the surgeon   Activity levels were also reviewed with the patient in detail, he is to lift no greater than 10 pounds, advised to limit bend/twisting at the waist and ambulation is encouraged as tolerated  Verified date/time/location of upcoming POV on 8/22/2019  He is to call the office with any further questions or concerns, or if any incisional issues or fevers would arise

## 2019-08-12 NOTE — DISCHARGE SUMMARY
Discharge Summary   Anais Hay 1955 58 y o  male   MRN: [id number]    Unit/Bed#: -01 Encounter: [id number]    Admission Date: 7/11/2019   Admitting Diagnosis: Back pain [M54 9]  Radiculopathy [M54 10]  Acute midline low back pain with left-sided sciatica [M54 42]  Chronic left-sided low back pain without sciatica [M54 5, G89 29]  Spinal stenosis of lumbar region, unspecified whether neurogenic claudication present [M48 061]      Procedures Performed: Metrx minimally invasive left L1-2 discectomy, left L2-3 foraminotomy, left L3-4 hemilaminectomy and bilateral foramintomy     Surgical Pathology:   none or pending     Order Name Source Comment Collection Info Order Time   PLATELET COUNT Hand, Left  Collected By: Frankey Ores, RN 7/12/2019  4:06 PM       Hospital Course:  26-year-old male with history of chronic low back pain  This is been exacerbated over last few weeks prior to admission  He had pain radiating to his left side to his left groin and testicle  As well as the anterior thigh stopping at the knee  MRI with contrast showed previous anterior fusion with bilateral stenosis  There is a broad-based disc bulge and disc perfusion with disc herniation at L1-L2  Fragment on the left side  After careful review he was scheduled for surgery with Dr Ashwini Brady  He underwent minimally invasive left L1 L2 diskectomy L2-L3 foraminotomy and L3- L4 hemilaminectomy with bilateral foraminotomy  He did well in the postoperative  His pain was adequately controlled  There were no complications during the hospitalization he was able to be discharged home on postoperative day 2  Follow-up as scheduled in the office postoperatively        Patient Active Problem List   Diagnosis    Leg pain    History of resection of meningioma    Trochanteric bursitis    Tobacco abuse    Chronic left-sided low back pain without sciatica    [facility]) [facility]    Spinal stenosis  Opioid dependence ([facility] )       Discharge Diagnosis:   1  POD #2 Metric MIS left L1-L2 diskectomy, left L2-L3 foraminotomy, left L3-L4 hemilaminectomy and bilateral foraminotomy  2  Spinal stenosis of the lumbar region with neurogenic claudication  3  Chronic lower back pain  4  COPD      Condition at Discharge: good     Discharge instructions/Information to patient and family:   See after visit summary for information provided to patient and family  Provisions for Follow-Up Care:  See after visit summary for information related to follow-up care and any pertinent home health orders  Disposition: Home    Discharge Statement   I spent 30 minutes discharging the patient  This time was spent on the day of discharge  I had direct contact with the patient on the day of discharge  Additional documentation is required if more than 30 minutes were spent on discharge  Discharge Medications:  See after visit summary for reconciled discharge medications provided to patient and family

## 2019-08-22 ENCOUNTER — OFFICE VISIT (OUTPATIENT)
Dept: NEUROSURGERY | Facility: CLINIC | Age: 64
End: 2019-08-22

## 2019-08-22 VITALS
WEIGHT: 180 LBS | DIASTOLIC BLOOD PRESSURE: 82 MMHG | SYSTOLIC BLOOD PRESSURE: 124 MMHG | TEMPERATURE: 98.2 F | HEART RATE: 80 BPM | RESPIRATION RATE: 16 BRPM | BODY MASS INDEX: 25.2 KG/M2 | HEIGHT: 71 IN

## 2019-08-22 DIAGNOSIS — Z09 POSTOPERATIVE EXAMINATION: Primary | ICD-10-CM

## 2019-08-22 PROCEDURE — 99024 POSTOP FOLLOW-UP VISIT: CPT | Performed by: NEUROLOGICAL SURGERY

## 2019-08-22 RX ORDER — DIAZEPAM 10 MG/1
10 TABLET ORAL EVERY 12 HOURS PRN
Qty: 3 TABLET | Refills: 0 | Status: SHIPPED | OUTPATIENT
Start: 2019-08-22

## 2019-08-22 NOTE — PROGRESS NOTES
[facility] Neurosurgery Office Note  Kim Yates is a 58 y o  male      Type of Visit: Post-op      Diagnoses and all orders for this visit:    Postoperative examination  -     Ambulatory referral to Physical Therapy; Future  -     diazepam (VALIUM) 10 mg tablet; Take 1 tablet (10 mg total) by mouth every 12 (twelve) hours as needed for anxiety        DISCUSSION SUMMARY  Patient in general is doing very well  His leg pain is gone  He does complain of intermittent periods of spasms in his legs  He finds that Valium works very well for this  We had a long talk about how Valium was a post muscle relaxant but that it should not be used chronically  He had agreed to uses only for crisis as Robaxin Skelaxin and Flexeril do not help him  I gave him a total of 3 tablets  I also said that these would not be refilled  Physical therapy is essential for his long-term improvement  Return if symptoms worsen or fail to improve  CHIEF COMPLAINT  Patient presents for 6 week post-op     NEUROSURGERY PROCEDURES  7/12/19 DKO: Metrjose e minimally invasive left L1-2 discectomy, left L2-3 foraminotomy, left L3-4 hemilaminectomy and bilateral foramintomy  HISTORY OF PRESENT ILLNESS  Patient is a right-handed male with history of chronic low back pain  MRI with contrast showed previous anterior fusion with bilateral stenosis  There is a broad-based disc bulge and disc perfusion with disc herniation at L1-L2  Fragment on the left side  After careful review he was scheduled for surgery with Dr Amber Rojas  He underwent minimally invasive left L1 L2 diskectomy L2-L3 foraminotomy and L3- L4 hemilaminectomy with bilateral foraminotomy  Patient has no leg pain he gets periodic spasms in takes Valium for this  He is happy with the surgical results  REVIEW OF SYSTEMS  Review of Systems   Constitutional: Negative  HENT: Negative  Eyes: Negative  Respiratory: Negative  Cardiovascular: Negative      Endocrine: Negative  Genitourinary: Negative  Musculoskeletal: Negative  Patient states that he has no pain today but does admit to having muscle spasm across his lower back  Over all he is happy with the surgery results  Skin: Negative  Allergic/Immunologic: Negative  Neurological: Negative  Hematological: Negative  Psychiatric/Behavioral: Negative  All other systems reviewed and are negative  I reviewed the ROS  I reviewed the ROS      MEDICAL HISTORY  Active Ambulatory Problems     Diagnosis Date Noted    Leg pain 06/27/2019    History of resection of meningioma 06/27/2019    Trochanteric bursitis 06/27/2019    Tobacco abuse 06/27/2019    Chronic left-sided low back pain without sciatica 07/11/2019    [facility]) [facility] 07/11/2019    Spinal stenosis 07/11/2019    Opioid dependence ([facility] ) 07/11/2019     Resolved Ambulatory Problems     Diagnosis Date Noted    No Resolved Ambulatory Problems     Past Medical History:   Diagnosis Date    Chronic back pain     Lumbar herniated disc     Status post lumbar surgery        Past Surgical History:   Procedure Laterality Date    BACK SURGERY      LUMBAR LAMINECTOMY N/A 7/12/2019    Procedure: Metrx minimally invasive left L1-2 discectomy, left L2-3 foraminotomy, left L3-4 hemilaminectomy and bilateral foramintomy;  Surgeon: Gabriela Palm MD;  [facility];  Service: Neurosurgery    NECK SURGERY      SHOULDER SURGERY      SPINE SURGERY         Social History     Tobacco Use   Smoking Status Current Every Day Smoker    Packs/day: 0 50    Years: 30 00    Pack years: 15 00   Smokeless Tobacco Never Used       Social History     Substance and Sexual Activity   Alcohol Use Not Currently       Social History     Substance and Sexual Activity   Drug Use Never       Vitals:    08/22/19 0807   BP: 124/82   BP Location: Right arm   Patient Position: Sitting   Cuff Size: Adult   Pulse: 80   Resp: 16 Temp: 98 2 °F (36 8 °C)   TempSrc: Tympanic   Weight: 81 6 kg (180 lb)   Height: 5' 11" (1 803 m)         Current Outpatient Medications:     albuterol (VENTOLIN HFA) 90 mcg/act inhaler, ventolin hfa 108 (90 base) mcg/actaers, Disp: , Rfl:     DULoxetine (CYMBALTA) 60 mg delayed release capsule, Daily, Disp: , Rfl:     diazepam (VALIUM) 10 mg tablet, Take 1 tablet (10 mg total) by mouth every 12 (twelve) hours as needed for anxiety, Disp: 3 tablet, Rfl: 0    diazepam (VALIUM) 5 mg tablet, Take 1 tablet (5 mg total) by mouth every 12 (twelve) hours as needed for muscle spasms for up to 10 days, Disp: 8 tablet, Rfl: 0    diclofenac (VOLTAREN) 75 mg EC tablet, Take 1 tablet (75 mg total) by mouth 2 (two) times a day as needed (pain) Take with food   (Patient not taking: Reported on 8/22/2019), Disp: 60 tablet, Rfl: 1     Allergies   Allergen Reactions    Cefadroxil Other (See Comments) and Rash    Codeine Other (See Comments)     "sick to stomach"      Iodinated Diagnostic Agents Anaphylaxis    Sulfa Antibiotics Anaphylaxis and GI Intolerance    Clindamycin Rash        The following portions of the patient's history were updated by MA and reviewed by MD: allergies, current medications, past family history, past medical history, past social history, past surgical history and problem list       Physical Exam  Awake and alert  Gait and station are normal  Is flexibility at the hips appears to be normal  Power in the lower extremities is intact

## 2019-08-22 NOTE — PROGRESS NOTES
POST-OP EVALUATION  8/22/2019    Venus Barney is a 58 y o  male is here today for routine post-operative follow up  Pre-operatively he presented with the following symptoms:{ROS Positives:63316} Today he complains of {ROS Positives:35694}  Past Medical History:   Diagnosis Date    Chronic back pain     COPD (chronic obstructive pulmonary disease) (HCC)     Lumbar herniated disc     Status post lumbar surgery      Past Surgical History:   Procedure Laterality Date    BACK SURGERY      LUMBAR LAMINECTOMY N/A 7/12/2019    Procedure: Metrx minimally invasive left L1-2 discectomy, left L2-3 foraminotomy, left L3-4 hemilaminectomy and bilateral foramintomy;  Surgeon: Inez Osei MD;  [facility];  Service: Neurosurgery    NECK SURGERY      SHOULDER SURGERY      SPINE SURGERY          Current Outpatient Medications:     albuterol (VENTOLIN HFA) 90 mcg/act inhaler, ventolin hfa 108 (90 base) mcg/actaers, Disp: , Rfl:     DULoxetine (CYMBALTA) 60 mg delayed release capsule, Daily, Disp: , Rfl:     diazepam (VALIUM) 5 mg tablet, Take 1 tablet (5 mg total) by mouth every 12 (twelve) hours as needed for muscle spasms for up to 10 days, Disp: 8 tablet, Rfl: 0    diclofenac (VOLTAREN) 75 mg EC tablet, Take 1 tablet (75 mg total) by mouth 2 (two) times a day as needed (pain) Take with food  (Patient not taking: Reported on 8/22/2019), Disp: 60 tablet, Rfl: 1  Allergies: Cefadroxil; Codeine; Iodinated diagnostic agents; Sulfa antibiotics; and Clindamycin    Objective    Neurological exam reveals:   Musculoskeletal exam reveals: {MUSCULOSKELETAL CUYL:16805}    Assessment/Plan   Diagnoses and all orders for this visit:    Postoperative examination  -     Ambulatory referral to Physical Therapy;  Future        Inez Osei MD

## 2019-08-22 NOTE — LETTER
August 23, 2019     Shanta Busby, [address] [facility]  [address] [employer]    Patient: Mercedez Ricci   YOB: 1955   Date of Visit: 8/22/2019       Dear Dr Cali Said: Thank you for referring Ebenezerjohny Misbah to me for evaluation  Below are my notes for this consultation  If you have questions, please do not hesitate to call me  I look forward to following your patient along with you  Sincerely,        Cinthia Bence, MD        CC: No Recipients  Cinthia Bence, MD  8/23/2019  7:35 AM  Sign at close encounter  [facility] Office Note  Mercedez Ricci is a 58 y o  male      Type of Visit: Post-op      Diagnoses and all orders for this visit:    Postoperative examination  -     Ambulatory referral to Physical Therapy; Future  -     diazepam (VALIUM) 10 mg tablet; Take 1 tablet (10 mg total) by mouth every 12 (twelve) hours as needed for anxiety        DISCUSSION SUMMARY  Patient in general is doing very well  His leg pain is gone  He does complain of intermittent periods of spasms in his legs  He finds that Valium works very well for this  We had a long talk about how Valium was a post muscle relaxant but that it should not be used chronically  He had agreed to uses only for crisis as Robaxin Skelaxin and Flexeril do not help him  I gave him a total of 3 tablets  I also said that these would not be refilled  Physical therapy is essential for his long-term improvement  Return if symptoms worsen or fail to improve  CHIEF COMPLAINT  Patient presents for 6 week post-op     NEUROSURGERY PROCEDURES  7/12/19 DKO: Metrx minimally invasive left L1-2 discectomy, left L2-3 foraminotomy, left L3-4 hemilaminectomy and bilateral foramintomy  HISTORY OF PRESENT ILLNESS  Patient is a right-handed male with history of chronic low back pain  MRI with contrast showed previous anterior fusion with bilateral stenosis    There is a broad-based disc bulge and disc perfusion with disc herniation at L1-L2  Fragment on the left side  After careful review he was scheduled for surgery with Dr Marco Antonio Solis  He underwent minimally invasive left L1 L2 diskectomy L2-L3 foraminotomy and L3- L4 hemilaminectomy with bilateral foraminotomy  Patient has no leg pain he gets periodic spasms in takes Valium for this  He is happy with the surgical results  REVIEW OF SYSTEMS  Review of Systems   Constitutional: Negative  HENT: Negative  Eyes: Negative  Respiratory: Negative  Cardiovascular: Negative  Endocrine: Negative  Genitourinary: Negative  Musculoskeletal: Negative  Patient states that he has no pain today but does admit to having muscle spasm across his lower back  Over all he is happy with the surgery results  Skin: Negative  Allergic/Immunologic: Negative  Neurological: Negative  Hematological: Negative  Psychiatric/Behavioral: Negative  All other systems reviewed and are negative  I reviewed the ROS  I reviewed the ROS      MEDICAL HISTORY  Active Ambulatory Problems     Diagnosis Date Noted    Leg pain 06/27/2019    History of resection of meningioma 06/27/2019    Trochanteric bursitis 06/27/2019    Tobacco abuse 06/27/2019    Chronic left-sided low back pain without sciatica 07/11/2019    COPD (chronic obstructive pulmonary disease) ([facility] [address] ) 07/11/2019    Spinal stenosis 07/11/2019    Opioid dependence ([facility] [address] ) 07/11/2019     Resolved Ambulatory Problems     Diagnosis Date Noted    No Resolved Ambulatory Problems     Past Medical History:   Diagnosis Date    Chronic back pain     Lumbar herniated disc     Status post lumbar surgery        Past Surgical History:   Procedure Laterality Date    BACK SURGERY      LUMBAR LAMINECTOMY N/A 7/12/2019    Procedure: Metrx minimally invasive left L1-2 discectomy, left L2-3 foraminotomy, left L3-4 hemilaminectomy and bilateral foramintomy;  Surgeon: Delfina Velarde MD;  [facility]; Service: Neurosurgery    NECK SURGERY      SHOULDER SURGERY      SPINE SURGERY         Social History     Tobacco Use   Smoking Status Current Every Day Smoker    Packs/day: 0 50    Years: 30 00    Pack years: 15 00   Smokeless Tobacco Never Used       Social History     Substance and Sexual Activity   Alcohol Use Not Currently       Social History     Substance and Sexual Activity   Drug Use Never       Vitals:    08/22/19 0807   BP: 124/82   BP Location: Right arm   Patient Position: Sitting   Cuff Size: Adult   Pulse: 80   Resp: 16   Temp: 98 2 °F (36 8 °C)   TempSrc: Tympanic   Weight: 81 6 kg (180 lb)   Height: 5' 11" (1 803 m)         Current Outpatient Medications:     albuterol (VENTOLIN HFA) 90 mcg/act inhaler, ventolin hfa 108 (90 base) mcg/actaers, Disp: , Rfl:     DULoxetine (CYMBALTA) 60 mg delayed release capsule, Daily, Disp: , Rfl:     diazepam (VALIUM) 10 mg tablet, Take 1 tablet (10 mg total) by mouth every 12 (twelve) hours as needed for anxiety, Disp: 3 tablet, Rfl: 0    diazepam (VALIUM) 5 mg tablet, Take 1 tablet (5 mg total) by mouth every 12 (twelve) hours as needed for muscle spasms for up to 10 days, Disp: 8 tablet, Rfl: 0    diclofenac (VOLTAREN) 75 mg EC tablet, Take 1 tablet (75 mg total) by mouth 2 (two) times a day as needed (pain) Take with food   (Patient not taking: Reported on 8/22/2019), Disp: 60 tablet, Rfl: 1     Allergies   Allergen Reactions    Cefadroxil Other (See Comments) and Rash    Codeine Other (See Comments)     "sick to stomach"      Iodinated Diagnostic Agents Anaphylaxis    Sulfa Antibiotics Anaphylaxis and GI Intolerance    Clindamycin Rash        The following portions of the patient's history were updated by MA and reviewed by MD: allergies, current medications, past family history, past medical history, past social history, past surgical history and problem list       Physical Exam  Awake and alert  Gait and station are normal  Is flexibility at the hips appears to be normal  Power in the lower extremities is intact

## 2019-11-11 ENCOUNTER — HOSPITAL ENCOUNTER (EMERGENCY)
Facility: HOSPITAL | Age: 64
Discharge: LEFT BEFORE DISCHARGE | End: 2019-11-11
Attending: EMERGENCY MEDICINE | Admitting: EMERGENCY MEDICINE
Payer: MEDICARE

## 2019-11-11 VITALS
TEMPERATURE: 98.7 F | SYSTOLIC BLOOD PRESSURE: 126 MMHG | RESPIRATION RATE: 18 BRPM | DIASTOLIC BLOOD PRESSURE: 81 MMHG | HEART RATE: 94 BPM | OXYGEN SATURATION: 98 %

## 2019-11-11 DIAGNOSIS — M54.50 LEFT LOW BACK PAIN: ICD-10-CM

## 2019-11-11 DIAGNOSIS — S29.012A MUSCLE STRAIN OF LEFT UPPER BACK, INITIAL ENCOUNTER: Primary | ICD-10-CM

## 2019-11-11 PROCEDURE — 99284 EMERGENCY DEPT VISIT MOD MDM: CPT | Performed by: EMERGENCY MEDICINE

## 2019-11-11 PROCEDURE — 96372 THER/PROPH/DIAG INJ SC/IM: CPT

## 2019-11-11 PROCEDURE — 99283 EMERGENCY DEPT VISIT LOW MDM: CPT

## 2019-11-11 RX ORDER — ACETAMINOPHEN 325 MG/1
975 TABLET ORAL ONCE
Status: DISCONTINUED | OUTPATIENT
Start: 2019-11-11 | End: 2019-11-11 | Stop reason: HOSPADM

## 2019-11-11 RX ORDER — KETOROLAC TROMETHAMINE 30 MG/ML
15 INJECTION, SOLUTION INTRAMUSCULAR; INTRAVENOUS ONCE
Status: COMPLETED | OUTPATIENT
Start: 2019-11-11 | End: 2019-11-11

## 2019-11-11 RX ORDER — DIAZEPAM 5 MG/1
5 TABLET ORAL 3 TIMES DAILY PRN
Qty: 9 TABLET | Refills: 0 | Status: SHIPPED | OUTPATIENT
Start: 2019-11-11 | End: 2019-11-14

## 2019-11-11 RX ORDER — LIDOCAINE 50 MG/G
1 PATCH TOPICAL ONCE
Status: DISCONTINUED | OUTPATIENT
Start: 2019-11-11 | End: 2019-11-11 | Stop reason: HOSPADM

## 2019-11-11 RX ORDER — LIDOCAINE HYDROCHLORIDE 10 MG/ML
10 INJECTION, SOLUTION EPIDURAL; INFILTRATION; INTRACAUDAL; PERINEURAL ONCE
Status: DISCONTINUED | OUTPATIENT
Start: 2019-11-11 | End: 2019-11-11 | Stop reason: HOSPADM

## 2019-11-11 RX ADMIN — KETOROLAC TROMETHAMINE 15 MG: 30 INJECTION, SOLUTION INTRAMUSCULAR at 14:23

## 2019-11-11 RX ADMIN — LIDOCAINE 1 PATCH: 50 PATCH CUTANEOUS at 15:16

## 2019-11-11 NOTE — ED ATTENDING ATTESTATION
Muna Jansen MD, saw and evaluated the patient  All available labs and X-rays were ordered by me or the resident and have been reviewed by myself  I discussed the patient with the resident / non-physician and agree with the resident's / non-physician practitioner's findings and plan as documented in the resident's / non-physician practicitioner's note, except where noted  At this point, I agree with the current assessment done in the ED  I was present during key portions of all procedures performed unless otherwise stated  Chief Complaint   Patient presents with    Back Pain     pt reports he had back surgery in July and states he is now having back pain since yesterday  pt states "I can feel a knot in my back and it is terrible pain "     This is a 19-year-old male presenting for evaluation of back pain  He states that last night he was getting up from his recliner and as he was doing so he felt a strain in the left lower back like a spasm, almost like a towel was being run out at a single placed in his back"  Denies falls trauma  Denies fevers chills nausea vomiting chest pain shortness of breath  No numbness or tingling down the arms or legs  No saddle anesthesia  Any time he moves or gets around or rotates his trunk he has severe pain elicited  It does not feel like sciatica that he has had before  It does not feel like the back pain for which she had back surgery done in July  It feels like back spasms that he has had before  He tried Naprosyn  It did not help  PMH:  - copd  - back pain / herniated discs  PSH:  - spine surgery x4  - shoulder surgery  - neck surgery  - lumbar laminectomy  Smokes daily  No alcohol/drugs  PE:  Vitals:    11/11/19 1255   BP: 126/81   BP Location: Right arm   Pulse: 94   Resp: 18   Temp: 98 7 °F (37 1 °C)   TempSrc: Oral   SpO2: 98%   General: VSS, NAD, awake, alert  Well-nourished, well-developed  Appears stated age     Speaking normally in full sentences  Head: Normocephalic, atraumatic, nontender  Eyes: PERRL, EOM-I  No diplopia  No hyphema  No subconjunctival hemorrhages  Symmetrical lids  ENT: Atraumatic external nose and ears  MMM  No malocclusion  No stridor  Normal phonation  No drooling  Normal swallowing  Neck: Symmetric, trachea midline  No JVD  CV: RRR  +S1/S2  No murmurs or gallops  Peripheral pulses +2 throughout  No chest wall tenderness  Lungs:   Unlabored No retractions  CTAB, lungs sounds equal bilateral    No tachypnea  Abd: +BS, soft, NT/ND    MSK:   EHL/FHL/PF/DF/KF/KE/HF/HE 5/5  No saddle anesthesia  2+ patellar reflexes  LEFT lower back tenderness localized to a trigger point in the lumbar level, paravertebral on left  NOTHING midline or right sided  SLR negative but elicits severe low back pain with left leg  No saddle anesthesia  2+ patellars  Back:   No rashes  Skin: Dry, intact  Neuro: AAOx3, GCS 15, CN II-XII grossly intact  Motor grossly intact  Psychiatric/Behavioral: Appropriate mood and affect   Exam: deferred  A:  - LEft back strain  P:  - Valium has worked for him before; discussed this  Previously used 10mg doses  - Will do TPI injection here, f/u neurosurgery  - 13 point ROS was performed and all are normal unless stated in the history above  - Nursing note reviewed  Vitals reviewed  - Orders placed by myself and/or advanced practitioner / resident     - Previous chart was reviewed  - No language barrier    - History obtained from patient  - There are no limitations to the history obtained  - Critical care time: Not applicable for this patient  Code Status: Prior  Advance Directive and Living Will:      Power of :    POLST:      Final Diagnosis:  1  Muscle strain of left upper back, initial encounter    2   Left low back pain         Medications   ketorolac (TORADOL) injection 15 mg (15 mg Intramuscular Given 11/11/19 7614)     No orders to display     No orders of the defined types were placed in this encounter  Labs Reviewed - No data to display  Time reflects when diagnosis was documented in both MDM as applicable and the Disposition within this note     Time User Action Codes Description Comment    11/11/2019  2:13 PM Imani Armstrong Add [S29 012A] Muscle strain of left upper back, initial encounter     11/11/2019  3:26 PM Harjeet Kate Add [M54 5] Left low back pain       ED Disposition     ED Disposition Condition Date/Time Comment    Eloped  Mon Nov 11, 2019  3:26 PM       Follow-up Information    None       Discharge Medication List as of 11/11/2019  3:43 PM      CONTINUE these medications which have CHANGED    Details   !! diazepam (VALIUM) 5 mg tablet Take 1 tablet (5 mg total) by mouth 3 (three) times a day as needed for anxiety (No driving while utilizing) for up to 3 days, Starting Mon 11/11/2019, Until Thu 11/14/2019, Normal       !! - Potential duplicate medications found  Please discuss with provider  CONTINUE these medications which have NOT CHANGED    Details   albuterol (VENTOLIN HFA) 90 mcg/act inhaler ventolin hfa 108 (90 base) mcg/actaers, Historical Med      !! diazepam (VALIUM) 10 mg tablet Take 1 tablet (10 mg total) by mouth every 12 (twelve) hours as needed for anxiety, Starting Thu 8/22/2019, Normal      diclofenac (VOLTAREN) 75 mg EC tablet Take 1 tablet (75 mg total) by mouth 2 (two) times a day as needed (pain) Take with food , Starting Fri 6/21/2019, Normal      DULoxetine (CYMBALTA) 60 mg delayed release capsule Daily, Historical Med       !! - Potential duplicate medications found  Please discuss with provider  No discharge procedures on file  Prior to Admission Medications   Prescriptions Last Dose Informant Patient Reported? Taking?    DULoxetine (CYMBALTA) 60 mg delayed release capsule   Yes No   Sig: Daily   albuterol (VENTOLIN HFA) 90 mcg/act inhaler   Yes No   Sig: ventolin hfa 108 (90 base) mcg/actaers   diazepam (VALIUM) 10 mg tablet   No No   Sig: Take 1 tablet (10 mg total) by mouth every 12 (twelve) hours as needed for anxiety   diazepam (VALIUM) 5 mg tablet   No No   Sig: Take 1 tablet (5 mg total) by mouth every 12 (twelve) hours as needed for muscle spasms for up to 10 days   diclofenac (VOLTAREN) 75 mg EC tablet   No No   Sig: Take 1 tablet (75 mg total) by mouth 2 (two) times a day as needed (pain) Take with food  Patient not taking: Reported on 8/22/2019      Facility-Administered Medications: None       Portions of the record may have been created with voice recognition software  Occasional wrong word or "sound a like" substitutions may have occurred due to the inherent limitations of voice recognition software  Read the chart carefully and recognize, using context, where substitutions have occurred      Electronically signed by:  Jayde Akhtar

## 2019-11-11 NOTE — ED PROVIDER NOTES
History  Chief Complaint   Patient presents with    Back Pain     pt reports he had back surgery in July and states he is now having back pain since yesterday  pt states "I can feel a knot in my back and it is terrible pain "     HPI     71yo male presents for evaluation of back pain  Patient says he began having left sided, lower back pain last night after getting up from his recliner  Patient notes pain feels like a spasm and that he felt a knot last night which he tried to loosen up  Pain has been constant, 7/10 and does not radiate  Patient says he took excedrin and naproxen this morning without relief of pain  Patient notes he has a history of prior back surgery (lumbar laminectomy, discectomy in July 2019)  Patient says he has been able to ambulate but has pain in doing so  Patient denies any trauma  Patient denies fever, chills, chest pain, shortness of breath, nausea, vomiting, abdominal pain, diarrhea, constipation, loss of bowel or bladder control, saddle anesthesia, dysuria, hematuria, extremity numbness /tingling or weakness  Denies IV drug abuse or recent steroid use  Denies history of malignancy  Prior to Admission Medications   Prescriptions Last Dose Informant Patient Reported? Taking? DULoxetine (CYMBALTA) 60 mg delayed release capsule   Yes No   Sig: Daily   albuterol (VENTOLIN HFA) 90 mcg/act inhaler   Yes No   Sig: ventolin hfa 108 (90 base) mcg/actaers   diazepam (VALIUM) 10 mg tablet   No No   Sig: Take 1 tablet (10 mg total) by mouth every 12 (twelve) hours as needed for anxiety   diazepam (VALIUM) 5 mg tablet   No No   Sig: Take 1 tablet (5 mg total) by mouth every 12 (twelve) hours as needed for muscle spasms for up to 10 days   diclofenac (VOLTAREN) 75 mg EC tablet   No No   Sig: Take 1 tablet (75 mg total) by mouth 2 (two) times a day as needed (pain) Take with food     Patient not taking: Reported on 8/22/2019      Facility-Administered Medications: None       Past Medical History: Diagnosis Date    Chronic back pain     COPD (chronic obstructive pulmonary disease) (HCC)     Lumbar herniated disc     Status post lumbar surgery        Past Surgical History:   Procedure Laterality Date    BACK SURGERY      LUMBAR LAMINECTOMY N/A 7/12/2019    Procedure: Metrx minimally invasive left L1-2 discectomy, left L2-3 foraminotomy, left L3-4 hemilaminectomy and bilateral foramintomy;  Surgeon: Paul Graff MD;  [facility];  Service: Neurosurgery    NECK SURGERY      SHOULDER SURGERY      SPINE SURGERY         History reviewed  No pertinent family history  I have reviewed and agree with the history as documented  Social History     Tobacco Use    Smoking status: Current Every Day Smoker     Packs/day: 0 50     Years: 30 00     Pack years: 15 00    Smokeless tobacco: Never Used   Substance Use Topics    Alcohol use: Not Currently    Drug use: Never        Review of Systems   Constitutional: Negative for chills, diaphoresis, fatigue and fever  HENT: Negative for congestion, rhinorrhea and sore throat  Eyes: Negative for photophobia and visual disturbance  Respiratory: Negative for cough, chest tightness and shortness of breath  Cardiovascular: Negative for chest pain and palpitations  Gastrointestinal: Negative for abdominal pain, blood in stool, constipation, diarrhea, nausea and vomiting  Genitourinary: Negative for dysuria, frequency and hematuria  Musculoskeletal: Positive for back pain  Negative for gait problem, myalgias, neck pain and neck stiffness  Skin: Negative for pallor and rash  Neurological: Negative for weakness, light-headedness, numbness and headaches  Hematological: Negative for adenopathy  Does not bruise/bleed easily  All other systems reviewed and are negative        Physical Exam  ED Triage Vitals [11/11/19 1255]   Temperature Pulse Respirations Blood Pressure SpO2   98 7 °F (37 1 °C) 94 18 126/81 98 %      Temp Source Heart Rate Source Patient Position - Orthostatic VS BP Location FiO2 (%)   Oral Monitor Lying Right arm --      Pain Score       8             Orthostatic Vital Signs  Vitals:    11/11/19 1255   BP: 126/81   Pulse: 94   Patient Position - Orthostatic VS: Lying       Physical Exam   Constitutional: He is oriented to person, place, and time  He appears well-developed and well-nourished  No distress  Patient alert and oriented, appears to be uncomfortable due to pain but appears non-toxic   HENT:   Head: Normocephalic and atraumatic  Mouth/Throat: Oropharynx is clear and moist    Eyes: Pupils are equal, round, and reactive to light  Conjunctivae and EOM are normal    Neck: Normal range of motion  Neck supple  Cardiovascular: Normal rate, regular rhythm, normal heart sounds and intact distal pulses  Pulmonary/Chest: Effort normal and breath sounds normal  No respiratory distress  Abdominal: Soft  Bowel sounds are normal  He exhibits no distension  There is no tenderness  Musculoskeletal: Normal range of motion  He exhibits tenderness  Lymphadenopathy:     He has no cervical adenopathy  Neurological: He is alert and oriented to person, place, and time  No facial asymmetry noted, CN 2-12 intact, full ROM of upper and lower extremities, muscle strength 5/5 throughout, DTRs normal, sensation intact throughout, no gait disturbance noted   Skin: Skin is warm and dry  Capillary refill takes less than 2 seconds  No rash noted  He is not diaphoretic  No erythema  No pallor  Psychiatric: He has a normal mood and affect  His behavior is normal  Judgment and thought content normal    Nursing note and vitals reviewed        ED Medications  Medications   lidocaine (LIDODERM) 5 % patch 1 patch (1 patch Topical Medication Applied 11/11/19 1516)   acetaminophen (TYLENOL) tablet 975 mg (975 mg Oral Not Given 11/11/19 1421)   lidocaine (PF) (XYLOCAINE-MPF) 1 % injection 10 mL (10 mL Infiltration Not Given 11/11/19 1419) ketorolac (TORADOL) injection 15 mg (15 mg Intramuscular Given 11/11/19 1423)       Diagnostic Studies  Results Reviewed     None                 No orders to display         Procedures  Procedures        ED Course  ED Course as of Nov 11 1531   [facility] Nov 11, 2019   1527 Patient eloped prior to trigger point injections  Identification of Seniors at Risk      Most Recent Value   (ISAR) Identification of Seniors at Risk   Before the illness or injury that brought you to the Emergency, did you need someone to help you on a regular basis? 0 Filed at: 11/11/2019 1256   In the last 24 hours, have you needed more help than usual?  0 Filed at: 11/11/2019 1256   Have you been hospitalized for one or more nights during the past 6 months? 1 Filed at: 11/11/2019 1256   In general, do you see well?  0 Filed at: 11/11/2019 1256   In general, do you have serious problems with your memory? 0 Filed at: 11/11/2019 1256   Do you take more than three different medications every day?  0 Filed at: 11/11/2019 1256   ISAR Score  1 Filed at: 11/11/2019 1256                          MDM  Number of Diagnoses or Management Options  Left low back pain:   Muscle strain of left upper back, initial encounter:   Diagnosis management comments: 24-year-old male presents for evaluation of low back pain  Patient appears uncomfortable due to pain but otherwise no acute distress  Patient is hemodynamically stable  Will provide analgesia with Tylenol, Toradol, and trigger point injection  Will also provide a lidoderm patch        Disposition  Final diagnoses:   Muscle strain of left upper back, initial encounter   Left low back pain     Time reflects when diagnosis was documented in both MDM as applicable and the Disposition within this note     Time User Action Codes Description Comment    11/11/2019  2:13 PM Yoanna Hill Add [P96 907O] Muscle strain of left upper back, initial encounter     11/11/2019  3:26 PM Tia Harrington Add [M54 5] Left low back pain       ED Disposition     ED Disposition Condition Date/Time Comment    Eloped  Mon Nov 11, 2019  3:26 PM       Follow-up Information    None         Patient's Medications   Discharge Prescriptions    DIAZEPAM (VALIUM) 5 MG TABLET    Take 1 tablet (5 mg total) by mouth 3 (three) times a day as needed for anxiety (No driving while utilizing) for up to 3 days       Start Date: 11/11/2019End Date: 11/14/2019       Order Dose: 5 mg       Quantity: 9 tablet    Refills: 0     No discharge procedures on file  ED Provider  Attending physically available and evaluated Zee Amy VAUGHN managed the patient along with the ED Attending      Electronically Signed by         John Burk MD  11/11/19 0009

## 2020-02-25 ENCOUNTER — HOSPITAL ENCOUNTER (EMERGENCY)
Facility: HOSPITAL | Age: 65
Discharge: HOME | End: 2020-02-25
Attending: EMERGENCY MEDICINE | Admitting: EMERGENCY MEDICINE
Payer: COMMERCIAL

## 2020-02-25 VITALS
BODY MASS INDEX: 27.89 KG/M2 | DIASTOLIC BLOOD PRESSURE: 84 MMHG | HEART RATE: 67 BPM | TEMPERATURE: 98.7 F | SYSTOLIC BLOOD PRESSURE: 151 MMHG | RESPIRATION RATE: 20 BRPM | OXYGEN SATURATION: 97 % | WEIGHT: 200 LBS

## 2020-02-25 DIAGNOSIS — M54.50 LOW BACK PAIN: Primary | ICD-10-CM

## 2020-02-25 PROCEDURE — 99283 EMERGENCY DEPT VISIT LOW MDM: CPT

## 2020-02-25 PROCEDURE — 20552 NJX 1/MLT TRIGGER POINT 1/2: CPT | Performed by: EMERGENCY MEDICINE

## 2020-02-25 PROCEDURE — 96372 THER/PROPH/DIAG INJ SC/IM: CPT

## 2020-02-25 PROCEDURE — 99284 EMERGENCY DEPT VISIT MOD MDM: CPT | Performed by: EMERGENCY MEDICINE

## 2020-02-25 RX ORDER — LIDOCAINE 50 MG/G
1 PATCH TOPICAL ONCE
Status: DISCONTINUED | OUTPATIENT
Start: 2020-02-25 | End: 2020-02-25 | Stop reason: HOSPADM

## 2020-02-25 RX ORDER — BUPIVACAINE HYDROCHLORIDE 5 MG/ML
10 INJECTION, SOLUTION EPIDURAL; INTRACAUDAL ONCE
Status: COMPLETED | OUTPATIENT
Start: 2020-02-25 | End: 2020-02-25

## 2020-02-25 RX ORDER — ACETAMINOPHEN 325 MG/1
975 TABLET ORAL ONCE
Status: DISCONTINUED | OUTPATIENT
Start: 2020-02-25 | End: 2020-02-25 | Stop reason: HOSPADM

## 2020-02-25 RX ORDER — KETOROLAC TROMETHAMINE 30 MG/ML
15 INJECTION, SOLUTION INTRAMUSCULAR; INTRAVENOUS ONCE
Status: COMPLETED | OUTPATIENT
Start: 2020-02-25 | End: 2020-02-25

## 2020-02-25 RX ORDER — DIAZEPAM 5 MG/1
5 TABLET ORAL EVERY 8 HOURS PRN
Qty: 9 TABLET | Refills: 0 | Status: SHIPPED | OUTPATIENT
Start: 2020-02-25 | End: 2020-03-06

## 2020-02-25 RX ADMIN — KETOROLAC TROMETHAMINE 15 MG: 30 INJECTION, SOLUTION INTRAMUSCULAR at 16:35

## 2020-02-25 RX ADMIN — BUPIVACAINE HYDROCHLORIDE 10 ML: 5 INJECTION, SOLUTION EPIDURAL; INTRACAUDAL at 16:36

## 2020-02-25 RX ADMIN — LIDOCAINE 1 PATCH: 50 PATCH TOPICAL at 16:35

## 2020-02-26 ENCOUNTER — TELEPHONE (OUTPATIENT)
Dept: PHYSICAL THERAPY | Facility: OTHER | Age: 65
End: 2020-02-26

## 2020-02-26 NOTE — TELEPHONE ENCOUNTER
Nurse returning patient VM left earlier this morning  Discussed [facility] program and offerings at length  Patient stated,"this is not what the doctor told him our office is"  Patient declined physical therapy evaluation or any sessions  After some discussion with the patient, he is looking to schedule an appointment with Pain Medicine/ S&P  Nurse provided him with the contact information for that department and also suggested he discuss any needs with his PCP  Nurse encouraged patient to call us in the future if needed for PT or PM&R  Agreed and very appreciative of time and information      Referral closed

## 2020-02-27 ENCOUNTER — TELEPHONE (OUTPATIENT)
Dept: OTHER | Facility: OTHER | Age: 65
End: 2020-02-27

## 2020-02-28 NOTE — ED PROVIDER NOTES
History  Chief Complaint   Patient presents with    Back Pain     C/O back pain  [place] last night/trip on rug , -headstrike -loc Denies thinners, previous surgery in AUG  L lower back pain with associated R leg pain      Patient is a 53-year-old male with history of chronic back pain including back surgeries in the past that presents with left-sided back pain  Patient says that he was in normal state health up until last night around 3:00 a m  He was getting up to go to bed  He says that he slipped on a throw rug though he did not fall the ground  He had a catch himself and had immediate left-sided lumbar back pain  He says that he has had significant pain left lumbar region since that time  He has taken Motrin with little relief of the pain  He describes muscle tightness and spasming which she has experienced in the past  Patient denies any trauma, unexplained weight loss, focal neurological deficit, bowel/bladder incontinence, fever, IV drug use, steroid use, known history of cancer  Patient denies any associated abdominal pain, chest pain, dyspnea  He has been ambulatory despite the pain  He denies any weakness, numbness, paresthesias of the legs  Prior to Admission Medications   Prescriptions Last Dose Informant Patient Reported? Taking?    DULoxetine (CYMBALTA) 60 mg delayed release capsule   Yes No   Sig: Daily   albuterol (VENTOLIN HFA) 90 mcg/act inhaler   Yes No   Sig: ventolin hfa 108 (90 base) mcg/actaers   diazepam (VALIUM) 10 mg tablet   No No   Sig: Take 1 tablet (10 mg total) by mouth every 12 (twelve) hours as needed for anxiety   diazepam (VALIUM) 5 mg tablet   No No   Sig: Take 1 tablet (5 mg total) by mouth every 12 (twelve) hours as needed for muscle spasms for up to 10 days   diazepam (VALIUM) 5 mg tablet   No No   Sig: Take 1 tablet (5 mg total) by mouth 3 (three) times a day as needed for anxiety (No driving while utilizing) for up to 3 days   diclofenac (VOLTAREN) 75 mg EC tablet   No No   Sig: Take 1 tablet (75 mg total) by mouth 2 (two) times a day as needed (pain) Take with food  Patient not taking: Reported on 8/22/2019      Facility-Administered Medications: None       Past Medical History:   Diagnosis Date    Chronic back pain     COPD (chronic obstructive pulmonary disease) (HCC)     Lumbar herniated disc     Status post lumbar surgery        Past Surgical History:   Procedure Laterality Date    BACK SURGERY      LUMBAR LAMINECTOMY N/A 7/12/2019    Procedure: Metrx minimally invasive left L1-2 discectomy, left L2-3 foraminotomy, left L3-4 hemilaminectomy and bilateral foramintomy;  Surgeon: Dorcas Adhikari MD;  Location: [facility];  Service: Neurosurgery    NECK SURGERY      SHOULDER SURGERY      SPINE SURGERY         History reviewed  No pertinent family history  I have reviewed and agree with the history as documented  E-Cigarette/Vaping    E-Cigarette Use Never User      E-Cigarette/Vaping Substances     Social History     Tobacco Use    Smoking status: Current Every Day Smoker     Packs/day: 0 50     Years: 30 00     Pack years: 15 00    Smokeless tobacco: Never Used   Substance Use Topics    Alcohol use: Not Currently    Drug use: Never        Review of Systems   Constitutional: Negative for chills, diaphoresis, fatigue and fever  HENT: Negative for drooling, facial swelling, sore throat and trouble swallowing  Eyes: Negative for photophobia  Respiratory: Negative for cough, choking, chest tightness, shortness of breath, wheezing and stridor  Cardiovascular: Negative for chest pain, palpitations and leg swelling  Gastrointestinal: Negative for abdominal distention, abdominal pain, diarrhea, nausea and vomiting  Genitourinary: Negative for dysuria  Musculoskeletal: Positive for back pain  Negative for neck pain and neck stiffness  Skin: Negative for color change, pallor and rash     Neurological: Negative for dizziness, speech difficulty, weakness, light-headedness, numbness and headaches  Hematological: Negative for adenopathy  Psychiatric/Behavioral: Negative for agitation  All other systems reviewed and are negative  Physical Exam  ED Triage Vitals   Temperature Pulse Respirations Blood Pressure SpO2   02/25/20 1552 02/25/20 1551 02/25/20 1551 02/25/20 1555 02/25/20 1551   98 7 °F (37 1 °C) 67 20 151/84 97 %      Temp Source Heart Rate Source Patient Position - Orthostatic VS BP Location FiO2 (%)   02/25/20 1552 02/25/20 1551 -- -- --   Oral Monitor         Pain Score       02/25/20 1551       8             Orthostatic Vital Signs  Vitals:    02/25/20 1551 02/25/20 1555   BP:  151/84   Pulse: 67        Physical Exam   Constitutional: He is oriented to person, place, and time  He appears well-developed and well-nourished  No distress  HENT:   Head: Normocephalic  Eyes: Pupils are equal, round, and reactive to light  EOM are normal    Neck: Normal range of motion  Neck supple  Cardiovascular: Normal rate, regular rhythm, normal heart sounds and intact distal pulses  Exam reveals no gallop and no friction rub  No murmur heard  Pulmonary/Chest: Effort normal and breath sounds normal    Abdominal: Soft  Bowel sounds are normal  He exhibits no distension  There is no tenderness  There is no guarding  Musculoskeletal: Normal range of motion  Patient with paralumbar back tenderness  Straight leg raise negative   Neurological: He is alert and oriented to person, place, and time  No cranial nerve deficit or sensory deficit  He exhibits normal muscle tone  Skin: Capillary refill takes less than 2 seconds  Psychiatric: He has a normal mood and affect  His behavior is normal  Judgment and thought content normal    Vitals reviewed        ED Medications  Medications   ketorolac (TORADOL) injection 15 mg (15 mg Intramuscular Given 2/25/20 1635)   bupivacaine (PF) (MARCAINE) 0 5 % injection 10 mL (10 mL Infiltration Given by Other 2/25/20 1636)       Diagnostic Studies  Results Reviewed     None                 No orders to display         Procedures  Trigger Injection 1 or 2 muscles  Date/Time: 2/27/2020 7:37 PM  Performed by: Josh Cummings MD  Authorized by: Josh Cummings MD     Patient location:  ED  Other Assisting Provider: No    Consent:     Consent obtained:  Verbal    Consent given by:  Patient    Alternatives discussed:  No treatment  Universal protocol:     Patient identity confirmed:  Verbally with patient and arm band  Location:     Body area:  Lower back    Injection Trigger Points:  1  Pre-procedure details:     Skin preparation:  Antiseptic wash  Skin anesthesia:     Skin anesthesia method:  None  Procedure details:     Needle gauge:  25 G    Anesthetic injected:  Bupivacaine 0 5% w/o epi    Steroid injected:  None    Additive injected:  None    Injection procedure:  Anatomic landmarks identified, incremental injection, anatomic landmarks palpated, introduced needle and negative aspiration for blood  Post-procedure details:     Dressing:  None    Outcome:  Pain relieved    Patient tolerance of procedure: Tolerated well, no immediate complications          ED Course           Identification of Seniors at Risk      Most Recent Value   (ISAR) Identification of Seniors at Risk   Before the illness or injury that brought you to the Emergency, did you need someone to help you on a regular basis? 0 Filed at: 02/25/2020 1552   In the last 24 hours, have you needed more help than usual?  0 Filed at: 02/25/2020 1552   Have you been hospitalized for one or more nights during the past 6 months? 0 Filed at: 02/25/2020 1552   In general, do you see well?  0 Filed at: 02/25/2020 1552   In general, do you have serious problems with your memory? 0 Filed at: 02/25/2020 1552   Do you take more than three different medications every day?   1 Filed at: 02/25/2020 1552   ISAR Score  1 Filed at: 02/25/2020 1552 MDM  Number of Diagnoses or Management Options  Low back pain:   Diagnosis management comments: Patient is a 30-year-old male with acute on chronic back pain without red flag symptoms  He was treated symptomatically with Toradol, Tylenol, Lidoderm patch  Trigger point injection was given with moderate improvement in symptoms  Will prescribe Valium secondary to a suspected back spasms  Disposition  Final diagnoses:   Low back pain     Time reflects when diagnosis was documented in both MDM as applicable and the Disposition within this note     Time User Action Codes Description Comment    2/25/2020  5:00 PM Diane Ulloa Add [M54 5] Low back pain       ED Disposition     ED Disposition Condition Date/Time Comment    Discharge Stable Tue Feb 25, 2020  5:00 PM Ortiz Swanson discharge to home/self care  Follow-up Information     Follow up With Specialties Details Why Contact Info Additional [address] [facility] Emergency Medicine  If symptoms worsen [address]  [phone]  ED, [address], [place], [place], [address] [facility]    [address] [facility] Physical Therapy Schedule an appointment as soon as possible for a visit   [phone]          Discharge Medication List as of 2/25/2020  5:01 PM      CONTINUE these medications which have CHANGED    Details   !! diazepam (VALIUM) 5 mg tablet Take 1 tablet (5 mg total) by mouth every 8 (eight) hours as needed for anxiety for up to 10 days, Starting Tue 2/25/2020, Until Fri 3/6/2020, Print       !! - Potential duplicate medications found  Please discuss with provider        CONTINUE these medications which have NOT CHANGED    Details   albuterol (VENTOLIN HFA) 90 mcg/act inhaler ventolin hfa 108 (90 base) mcg/actaers, Historical Med      !! diazepam (VALIUM) 10 mg tablet Take 1 tablet (10 mg total) by mouth every 12 (twelve) hours as needed for anxiety, Starting Thu 8/22/2019, Normal      diclofenac (VOLTAREN) 75 mg EC tablet Take 1 tablet (75 mg total) by mouth 2 (two) times a day as needed (pain) Take with food , Starting Fri 6/21/2019, Normal      DULoxetine (CYMBALTA) 60 mg delayed release capsule Daily, Historical Med       !! - Potential duplicate medications found  Please discuss with provider  PDMP Review       Value Time User    PDMP Reviewed  Yes 2/25/2020  5:10 PM Daniele Liu MD           ED Provider  Attending physically available and evaluated Fabrice Bernard  I managed the patient along with the ED Attending      Electronically Signed by         Liliam Sawyer MD  02/27/20 5601

## (undated) DEVICE — GLOVE SRG BIOGEL 8

## (undated) DEVICE — PLASTIC ADHESIVE BANDAGE: Brand: CURITY

## (undated) DEVICE — SURGIFOAM 7 X 12 SPONGE ABS

## (undated) DEVICE — DRESSING ALLEVYN LIFE HEEL 25 X 25.2CM

## (undated) DEVICE — TOOL 15BA50 LEGEND 15CM 5MM BA: Brand: MIDAS REX™

## (undated) DEVICE — SPONGE PVP SCRUB WING STERILE

## (undated) DEVICE — 3M™ STERI-STRIP™ REINFORCED ADHESIVE SKIN CLOSURES, R1547, 1/2 IN X 4 IN (12 MM X 100 MM), 6 STRIPS/ENVELOPE: Brand: 3M™ STERI-STRIP™

## (undated) DEVICE — BETHLEHEM UNIVERSAL SPINE, KIT: Brand: CARDINAL HEALTH

## (undated) DEVICE — INTENDED FOR TISSUE SEPARATION, AND OTHER PROCEDURES THAT REQUIRE A SHARP SURGICAL BLADE TO PUNCTURE OR CUT.: Brand: BARD-PARKER SAFETY BLADES SIZE 15, STERILE

## (undated) DEVICE — SUT VICRYL PLUS 3-0 RB-1 CR/8 18 IN VCP713D

## (undated) DEVICE — PREP SURGICAL PURPREP 26ML

## (undated) DEVICE — NEEDLE 18 G X 1 1/2

## (undated) DEVICE — PENCIL ELECTROSURG E-Z CLEAN -0035H

## (undated) DEVICE — FLOSEAL HEMOSTATIC MATRIX, 5 ML: Brand: FLOSEAL

## (undated) DEVICE — SYRINGE 3ML LL

## (undated) DEVICE — DRAPE MICROSCOPE OPMI PENTERO

## (undated) DEVICE — INTENDED FOR TISSUE SEPARATION, AND OTHER PROCEDURES THAT REQUIRE A SHARP SURGICAL BLADE TO PUNCTURE OR CUT.: Brand: BARD-PARKER ® CARBON RIB-BACK BLADES

## (undated) DEVICE — THE FLOSEAL MALLEABLE TIP AND TRIMMABLE TIP ARE INTENDED FOR DELIVERY OF FLOSEAL HEMOSTATIC MATRIX.: Brand: FLOSEAL SPECIAL APPLICATOR TIPS

## (undated) DEVICE — SNAP KOVER: Brand: UNBRANDED

## (undated) DEVICE — ELECTRODE BLADE MOD  E-Z CLEAN 6.5IN -0014M

## (undated) DEVICE — GLOVE INDICATOR PI UNDERGLOVE SZ 8.5 BLUE

## (undated) DEVICE — DRAPE SHEET X-LG

## (undated) DEVICE — DISPOSABLE EQUIPMENT COVER: Brand: SMALL TOWEL DRAPE

## (undated) DEVICE — TIBURON SPLIT SHEET: Brand: CONVERTORS